# Patient Record
Sex: MALE | Race: WHITE | NOT HISPANIC OR LATINO | Employment: FULL TIME | URBAN - METROPOLITAN AREA
[De-identification: names, ages, dates, MRNs, and addresses within clinical notes are randomized per-mention and may not be internally consistent; named-entity substitution may affect disease eponyms.]

---

## 2017-08-15 ENCOUNTER — OFFICE VISIT (OUTPATIENT)
Dept: ORTHOPEDIC SURGERY | Facility: CLINIC | Age: 77
End: 2017-08-15

## 2017-08-15 VITALS — BODY MASS INDEX: 23.84 KG/M2 | TEMPERATURE: 98 F | WEIGHT: 176 LBS | HEIGHT: 72 IN

## 2017-08-15 DIAGNOSIS — M25.561 ACUTE PAIN OF RIGHT KNEE: Primary | ICD-10-CM

## 2017-08-15 PROCEDURE — 73562 X-RAY EXAM OF KNEE 3: CPT | Performed by: NURSE PRACTITIONER

## 2017-08-15 PROCEDURE — 99213 OFFICE O/P EST LOW 20 MIN: CPT | Performed by: NURSE PRACTITIONER

## 2017-08-15 RX ORDER — MELOXICAM 7.5 MG/1
TABLET ORAL
Refills: 0 | COMMUNITY
Start: 2017-08-02 | End: 2017-09-07

## 2017-08-15 NOTE — PROGRESS NOTES
Patient: Hu Harris  YOB: 1940 77 y.o. male  Medical Record Number: [id number]    Chief Complaints:   Chief Complaint   Patient presents with   • Right Knee - Establish Care, Pain       History of Present Illness:Hu Harris is a 77 y.o. male who presents With complaints of right knee pain.  Patient reports it started about 3-4 weeks ago, denies any injury, just started with some generalized anterior knee aching and it has progressively gotten worse.  He describes the right knee pain as a severe stabbing type pain with clicking, worse with going up and down stairs, better with ice and rest.  He apparently sees pain management for his back in 5 days ago had a cortisone injection into the right knee.  It helped quite a bit for the first 24 hours, but the pain has returned.    Allergies:   Allergies   Allergen Reactions   • Iodine    • Penicillins        Medications:   Current Outpatient Prescriptions   Medication Sig Dispense Refill   • aspirin 325 MG tablet Take 325 mg by mouth daily.     • atorvastatin (LIPITOR) 40 MG tablet Take 40 mg by mouth daily.     • clonazePAM (KlonoPIN) 0.5 MG tablet   1   • levothyroxine (SYNTHROID, LEVOTHROID) 88 MCG tablet   2   • Multiple Vitamins-Minerals (MULTIVITAMIN WITH MINERALS) tablet Take 1 tablet by mouth daily.     • meloxicam (MOBIC) 7.5 MG tablet TAKE 1 T PO QD  0   • Naproxen Sodium 220 MG capsule Take  by mouth.       No current facility-administered medications for this visit.          The following portions of the patient's history were reviewed and updated as appropriate: allergies, current medications, past family history, past medical history, past social history, past surgical history and problem list.    Review of Systems:   A 14 point review of systems was performed. All systems negative except pertinent positives/negative listed in HPI above    Physical Exam:   Vitals:    08/15/17 1323   Temp: 98 °F (36.7 °C)   TempSrc: Temporal Artery    Weight:  "176 lb (79.8 kg)   Height: 72\" (182.9 cm)       General: A and O x 3, ASA, NAD    SCLERA:    Normal    DENTITION:   Normal  Skin clear no unusual lesions noted  Right knee patient has no appreciable effusion, 110° flexion, neutral in extension, with a positive medial Mihai negative Lockman calf is soft and nontender    Radiology:  Xrays 3views (ap,lateral, sunrise) right knee were ordered and reviewed today secondary to pain and show mild arthritic changes, particularly involving the patellofemoral compartment.  Comparative views are unchanged     Assessment/Plan:  Persistent severe right knee pain, rule out loose body    We will proceed with an MRI of the right knee.  Patient will call couple days after regarding the results.  "

## 2017-08-18 ENCOUNTER — OFFICE VISIT (OUTPATIENT)
Dept: ORTHOPEDIC SURGERY | Facility: CLINIC | Age: 77
End: 2017-08-18

## 2017-08-18 DIAGNOSIS — M25.561 ACUTE PAIN OF RIGHT KNEE: ICD-10-CM

## 2017-08-18 PROCEDURE — 73721 MRI JNT OF LWR EXTRE W/O DYE: CPT | Performed by: NURSE PRACTITIONER

## 2017-08-21 ENCOUNTER — TELEPHONE (OUTPATIENT)
Dept: ORTHOPEDIC SURGERY | Facility: CLINIC | Age: 77
End: 2017-08-21

## 2017-08-22 ENCOUNTER — TELEPHONE (OUTPATIENT)
Dept: ORTHOPEDIC SURGERY | Facility: CLINIC | Age: 77
End: 2017-08-22

## 2017-08-22 NOTE — TELEPHONE ENCOUNTER
NOTIFIED AND ADVISED PT OF MRI RESULTS.     MLL ALSO SPOKE TO PT EXTENSIVELY IN REGARDS TO HIS MRI RESULTS.   PT WILL KEEP HIS APPT Friday W/KAREEM

## 2017-08-22 NOTE — TELEPHONE ENCOUNTER
----- Message from FELIPE Nick sent at 8/22/2017  1:06 PM EDT -----  Please let patient know that the MRI of his right knee does show a meniscus tear, however it is secondary to the arthritis that he has in that knee.  There is more arthritis seen on MRI than appreciated on x-ray.  It is too soon for another cortisone injection, would recommend continued anti-inflammatories if he is able to take them and physical therapy exercises for both knees.  Would recommend that he make all up appointment with Dr. Pappas in approximately 4-6 weeks if still having pain.

## 2017-08-22 NOTE — TELEPHONE ENCOUNTER
Called patient with MRI results per MLL    Patient did not answer the phone so I was able to leave a VM

## 2017-08-25 ENCOUNTER — OFFICE VISIT (OUTPATIENT)
Dept: ORTHOPEDIC SURGERY | Facility: CLINIC | Age: 77
End: 2017-08-25

## 2017-08-25 ENCOUNTER — TELEPHONE (OUTPATIENT)
Dept: ORTHOPEDIC SURGERY | Facility: CLINIC | Age: 77
End: 2017-08-25

## 2017-08-25 VITALS — TEMPERATURE: 100 F | BODY MASS INDEX: 25.48 KG/M2 | HEIGHT: 70 IN | WEIGHT: 178 LBS

## 2017-08-25 DIAGNOSIS — M25.561 ACUTE PAIN OF RIGHT KNEE: Primary | ICD-10-CM

## 2017-08-25 PROCEDURE — 99212 OFFICE O/P EST SF 10 MIN: CPT | Performed by: NURSE PRACTITIONER

## 2017-08-25 NOTE — PROGRESS NOTES
"Patient: Hu Harris  YOB: 1940 77 y.o. male  Medical Record Number: [id number]    Chief Complaints:   Chief Complaint   Patient presents with   • Right Knee - Follow-up       History of Present Illness:Hu Harris is a 77 y.o. male who presents for follow-up of  Right knee pain.  We have had several discussions regarding his MRI.  He is still adamant that his pain is all from torn meniscus and has nothing to do with arthritis.  Dr. Pappas came in and saw the patient as well and after long discussion he would like to see Dr. Jimena Ho for further evaluation and treatment and to possibly discuss knee arthroscopy and possible subchondral plan see.    Allergies:   Allergies   Allergen Reactions   • Iodine    • Penicillins        Medications:   Current Outpatient Prescriptions   Medication Sig Dispense Refill   • atorvastatin (LIPITOR) 40 MG tablet Take 20 mg by mouth Daily.     • clonazePAM (KlonoPIN) 0.5 MG tablet   1   • levothyroxine (SYNTHROID, LEVOTHROID) 88 MCG tablet   2   • meloxicam (MOBIC) 7.5 MG tablet TAKE 1 T PO QD  0   • Multiple Vitamins-Minerals (MULTIVITAMIN WITH MINERALS) tablet Take 1 tablet by mouth daily.     • aspirin 325 MG tablet Take 325 mg by mouth daily.     • Naproxen Sodium 220 MG capsule Take  by mouth.       No current facility-administered medications for this visit.          The following portions of the patient's history were reviewed and updated as appropriate: allergies, current medications, past family history, past medical history, past social history, past surgical history and problem list.    Review of Systems:   A 14 point review of systems was performed. All systems negative except pertinent positives/negative listed in HPI above    Physical Exam:   Vitals:    08/25/17 1504   Temp: 100 °F (37.8 °C)   Weight: 178 lb (80.7 kg)   Height: 70\" (177.8 cm)       General: A and O x 3, ASA, NAD    SCLERA:    Normal    DENTITION:   Normal  Right knee trace amount of " fusion with image range of motion secondary to pain    Assessment/Plan:  Right knee pain secondary to torn meniscus complicated by osteoarthritis    Patient was referred to Dr. Jimena Ho to see if he would be a candidate for knee arthroscopy and possible subchondral plan the

## 2017-08-28 ENCOUNTER — TELEPHONE (OUTPATIENT)
Dept: ORTHOPEDIC SURGERY | Facility: CLINIC | Age: 77
End: 2017-08-28

## 2017-08-31 ENCOUNTER — CONSULT (OUTPATIENT)
Dept: ORTHOPEDIC SURGERY | Facility: CLINIC | Age: 77
End: 2017-08-31

## 2017-08-31 VITALS — WEIGHT: 176.8 LBS | BODY MASS INDEX: 25.31 KG/M2 | TEMPERATURE: 99.1 F | HEIGHT: 70 IN

## 2017-08-31 DIAGNOSIS — S83.241A OTHER TEAR OF MEDIAL MENISCUS OF RIGHT KNEE AS CURRENT INJURY, INITIAL ENCOUNTER: Primary | ICD-10-CM

## 2017-08-31 PROCEDURE — 99214 OFFICE O/P EST MOD 30 MIN: CPT | Performed by: ORTHOPAEDIC SURGERY

## 2017-09-01 PROBLEM — S83.209A CURRENT TEAR OF MENISCUS OF KNEE: Status: ACTIVE | Noted: 2017-09-01

## 2017-09-06 ENCOUNTER — TELEPHONE (OUTPATIENT)
Dept: ORTHOPEDIC SURGERY | Facility: CLINIC | Age: 77
End: 2017-09-06

## 2017-09-07 ENCOUNTER — APPOINTMENT (OUTPATIENT)
Dept: PREADMISSION TESTING | Facility: HOSPITAL | Age: 77
End: 2017-09-07

## 2017-09-07 VITALS
DIASTOLIC BLOOD PRESSURE: 80 MMHG | SYSTOLIC BLOOD PRESSURE: 150 MMHG | TEMPERATURE: 97.8 F | RESPIRATION RATE: 16 BRPM | WEIGHT: 176 LBS | HEIGHT: 72 IN | BODY MASS INDEX: 23.84 KG/M2 | HEART RATE: 66 BPM | OXYGEN SATURATION: 98 %

## 2017-09-07 LAB
DEPRECATED RDW RBC AUTO: 46.1 FL (ref 37–54)
ERYTHROCYTE [DISTWIDTH] IN BLOOD BY AUTOMATED COUNT: 14.2 % (ref 11.5–14.5)
HCT VFR BLD AUTO: 43.5 % (ref 40.4–52.2)
HGB BLD-MCNC: 14.4 G/DL (ref 13.7–17.6)
MCH RBC QN AUTO: 29.5 PG (ref 27–32.7)
MCHC RBC AUTO-ENTMCNC: 33.1 G/DL (ref 32.6–36.4)
MCV RBC AUTO: 89.1 FL (ref 79.8–96.2)
PLATELET # BLD AUTO: 227 10*3/MM3 (ref 140–500)
PMV BLD AUTO: 11.3 FL (ref 6–12)
RBC # BLD AUTO: 4.88 10*6/MM3 (ref 4.6–6)
WBC NRBC COR # BLD: 6.19 10*3/MM3 (ref 4.5–10.7)

## 2017-09-07 PROCEDURE — 93005 ELECTROCARDIOGRAM TRACING: CPT

## 2017-09-07 PROCEDURE — 85027 COMPLETE CBC AUTOMATED: CPT | Performed by: ORTHOPAEDIC SURGERY

## 2017-09-07 PROCEDURE — 36415 COLL VENOUS BLD VENIPUNCTURE: CPT

## 2017-09-07 PROCEDURE — 93010 ELECTROCARDIOGRAM REPORT: CPT | Performed by: INTERNAL MEDICINE

## 2017-09-07 RX ORDER — MELOXICAM 7.5 MG/1
7.5 TABLET ORAL EVERY MORNING
COMMUNITY
End: 2021-06-04

## 2017-09-07 NOTE — TELEPHONE ENCOUNTER
If he puts them on Flomax pre and post -op he usually sends them home with 1 week supply.  After that he refers them back to PCP or urology

## 2017-09-07 NOTE — TELEPHONE ENCOUNTER
This patient was put on Flomax by Dr. Pappas for his knee replacement can you find out how much he put him on an: In for me

## 2017-09-07 NOTE — DISCHARGE INSTRUCTIONS
SURGERY 9-11-17   ARRIVAL TIME 6:45    Take the following medications the morning of surgery with a small sip of water:  NONE      General Instructions:  • Do not eat solid food after midnight the night before surgery.  • You may drink clear liquids day of surgery but must stop at least one hour before your hospital arrival time.  • It is beneficial for you to have a clear drink that contains carbohydrates the day of surgery.  We suggest a 20 ounce bottle of Gatorade or Powerade for non-diabetic patients or a 20 ounce bottle of G2 or Powerade Zero for diabetic patients. (Pediatric patients, are not advised to drink a 20 ounce carbohydrate drink)    Clear liquids are liquids you can see through.  Nothing red in color.     Plain water                               Sports drinks  Sodas                                   Gelatin (Jell-O)  Fruit juices without pulp such as white grape juice and apple juice  Popsicles that contain no fruit or yogurt  Tea or coffee (no cream or milk added)  Gatorade / Powerade  G2 / Powerade Zero    • Infants may have breast milk up to four hours before surgery.  • Infants drinking formula may drink formula up to six hours before surgery.   • Patients who avoid smoking, chewing tobacco and alcohol for 4 weeks prior to surgery have a reduced risk of post-operative complications.  Quit smoking as many days before surgery as you can.  • Do not smoke, use chewing tobacco or drink alcohol the day of surgery.   • If applicable bring your C-PAP/ BI-PAP machine.  • Bring any papers given to you in the doctor’s office.  • Wear clean comfortable clothes and socks.  • Do not wear contact lenses or make-up.  Bring a case for your glasses.   • Bring crutches or walker if applicable.  • Leave all other valuables and jewelry at home.  • The Pre-Admission Testing nurse will instruct you to bring medications if unable to obtain an accurate list in Pre-Admission Testing.        If you were given a blood bank  ID arm band remember to bring it with you the day of surgery.    Preventing a Surgical Site Infection:  • For 2 to 3 days before surgery, avoid shaving with a razor because the razor can irritate skin and make it easier to develop an infection.  • The night prior to surgery sleep in a clean bed with clean clothing.  Do not allow pets to sleep with you.  • Shower on the morning of surgery using a fresh bar of anti-bacterial soap (such as Dial) and clean washcloth.  Dry with a clean towel and dress in clean clothing.  • Ask your surgeon if you will be receiving antibiotics prior to surgery.  • Make sure you, your family, and all healthcare providers clean their hands with soap and water or an alcohol based hand  before caring for you or your wound.    Day of surgery:  Upon arrival, a Pre-op nurse and Anesthesiologist will review your health history, obtain vital signs, and answer questions you may have.  The only belongings needed at this time will be your home medications and if applicable your C-PAP/BI-PAP machine.  If you are staying overnight your family can leave the rest of your belongings in the car and bring them to your room later.  A Pre-op nurse will start an IV and you may receive medication in preparation for surgery, including something to help you relax.  Your family will be able to see you in the Pre-op area.  While you are in surgery your family should notify the waiting room  if they leave the waiting room area and provide a contact phone number.    Please be aware that surgery does come with discomfort.  We want to make every effort to control your discomfort so please discuss any uncontrolled symptoms with your nurse.   Your doctor will most likely have prescribed pain medications.      If you are going home after surgery you will receive individualized written care instructions before being discharged.  A responsible adult must drive you to and from the hospital on the day of  your surgery and stay with you for 24 hours.    If you are staying overnight following surgery, you will be transported to your hospital room following the recovery period.  [facility] has all private rooms.    If you have any questions please call Pre-Admission Testing at [phone].  Deductibles and co-payments are collected on the day of service. Please be prepared to pay the required co-pay, deductible or deposit on the day of service as defined by your plan.

## 2017-09-07 NOTE — TELEPHONE ENCOUNTER
Dr. Mian Rushing gave this patient Flomax before his knee replacement is that routine is that something we can call in from him or should I have him call his primary care

## 2017-09-08 ENCOUNTER — TELEPHONE (OUTPATIENT)
Dept: ORTHOPEDIC SURGERY | Facility: CLINIC | Age: 77
End: 2017-09-08

## 2017-09-08 DIAGNOSIS — Z87.898 H/O URINARY RETENTION: Primary | ICD-10-CM

## 2017-09-08 RX ORDER — TAMSULOSIN HYDROCHLORIDE 0.4 MG/1
1 CAPSULE ORAL NIGHTLY
Qty: 7 CAPSULE | Refills: 0 | Status: SHIPPED | OUTPATIENT
Start: 2017-09-08 | End: 2020-09-11

## 2017-09-08 NOTE — TELEPHONE ENCOUNTER
Patient does have a history of postop urinary retention and is taken Flomax in the past.  Have E prescribed a new prescription to [facility] at [phone] for Flomax 0.4 mg, #7, directions her to take 1 by mouth nightly per [facility]

## 2017-09-11 ENCOUNTER — ANESTHESIA EVENT (OUTPATIENT)
Dept: PERIOP | Facility: HOSPITAL | Age: 77
End: 2017-09-11

## 2017-09-11 ENCOUNTER — ANESTHESIA (OUTPATIENT)
Dept: PERIOP | Facility: HOSPITAL | Age: 77
End: 2017-09-11

## 2017-09-11 ENCOUNTER — HOSPITAL ENCOUNTER (OUTPATIENT)
Facility: HOSPITAL | Age: 77
Setting detail: HOSPITAL OUTPATIENT SURGERY
Discharge: HOME | End: 2017-09-11
Attending: ORTHOPAEDIC SURGERY | Admitting: ORTHOPAEDIC SURGERY

## 2017-09-11 VITALS
SYSTOLIC BLOOD PRESSURE: 145 MMHG | HEART RATE: 76 BPM | RESPIRATION RATE: 16 BRPM | DIASTOLIC BLOOD PRESSURE: 81 MMHG | OXYGEN SATURATION: 99 % | TEMPERATURE: 97.3 F

## 2017-09-11 DIAGNOSIS — S83.241D OTHER TEAR OF MEDIAL MENISCUS OF RIGHT KNEE AS CURRENT INJURY, SUBSEQUENT ENCOUNTER: Primary | ICD-10-CM

## 2017-09-11 PROCEDURE — 25010000002 EPINEPHRINE PER 0.1 MG: Performed by: ORTHOPAEDIC SURGERY

## 2017-09-11 PROCEDURE — 25010000002 FENTANYL CITRATE (PF) 100 MCG/2ML SOLUTION: Performed by: NURSE ANESTHETIST, CERTIFIED REGISTERED

## 2017-09-11 PROCEDURE — 25010000002 PROPOFOL 10 MG/ML EMULSION: Performed by: NURSE ANESTHETIST, CERTIFIED REGISTERED

## 2017-09-11 PROCEDURE — 29881 ARTHRS KNE SRG MNISECTMY M/L: CPT | Performed by: ORTHOPAEDIC SURGERY

## 2017-09-11 PROCEDURE — 25010000002 KETOROLAC TROMETHAMINE PER 15 MG: Performed by: NURSE ANESTHETIST, CERTIFIED REGISTERED

## 2017-09-11 PROCEDURE — 25010000002 METHYLPREDNISOLONE PER 40 MG: Performed by: ORTHOPAEDIC SURGERY

## 2017-09-11 PROCEDURE — 25010000002 ONDANSETRON PER 1 MG: Performed by: NURSE ANESTHETIST, CERTIFIED REGISTERED

## 2017-09-11 PROCEDURE — 25010000002 MIDAZOLAM PER 1 MG: Performed by: ANESTHESIOLOGY

## 2017-09-11 PROCEDURE — 25010000002 DEXAMETHASONE PER 1 MG: Performed by: NURSE ANESTHETIST, CERTIFIED REGISTERED

## 2017-09-11 PROCEDURE — 25010000003 CEFAZOLIN PER 500 MG: Performed by: ORTHOPAEDIC SURGERY

## 2017-09-11 RX ORDER — CEFAZOLIN SODIUM 2 G/100ML
INJECTION, SOLUTION INTRAVENOUS
Status: DISCONTINUED
Start: 2017-09-11 | End: 2017-09-11 | Stop reason: HOSPADM

## 2017-09-11 RX ORDER — ONDANSETRON 2 MG/ML
4 INJECTION INTRAMUSCULAR; INTRAVENOUS ONCE AS NEEDED
Status: DISCONTINUED | OUTPATIENT
Start: 2017-09-11 | End: 2017-09-11 | Stop reason: HOSPADM

## 2017-09-11 RX ORDER — FLUMAZENIL 0.1 MG/ML
0.2 INJECTION INTRAVENOUS AS NEEDED
Status: DISCONTINUED | OUTPATIENT
Start: 2017-09-11 | End: 2017-09-11 | Stop reason: HOSPADM

## 2017-09-11 RX ORDER — PROMETHAZINE HYDROCHLORIDE 25 MG/1
25 SUPPOSITORY RECTAL ONCE AS NEEDED
Status: DISCONTINUED | OUTPATIENT
Start: 2017-09-11 | End: 2017-09-11 | Stop reason: HOSPADM

## 2017-09-11 RX ORDER — KETOROLAC TROMETHAMINE 30 MG/ML
INJECTION, SOLUTION INTRAMUSCULAR; INTRAVENOUS AS NEEDED
Status: DISCONTINUED | OUTPATIENT
Start: 2017-09-11 | End: 2017-09-11 | Stop reason: SURG

## 2017-09-11 RX ORDER — CEFAZOLIN SODIUM 1 G/3ML
2 INJECTION, POWDER, FOR SOLUTION INTRAMUSCULAR; INTRAVENOUS ONCE
Status: COMPLETED | OUTPATIENT
Start: 2017-09-11 | End: 2017-09-11

## 2017-09-11 RX ORDER — FENTANYL CITRATE 50 UG/ML
50 INJECTION, SOLUTION INTRAMUSCULAR; INTRAVENOUS
Status: DISCONTINUED | OUTPATIENT
Start: 2017-09-11 | End: 2017-09-11 | Stop reason: HOSPADM

## 2017-09-11 RX ORDER — PROMETHAZINE HYDROCHLORIDE 25 MG/ML
12.5 INJECTION, SOLUTION INTRAMUSCULAR; INTRAVENOUS ONCE AS NEEDED
Status: DISCONTINUED | OUTPATIENT
Start: 2017-09-11 | End: 2017-09-11 | Stop reason: HOSPADM

## 2017-09-11 RX ORDER — HYDRALAZINE HYDROCHLORIDE 20 MG/ML
5 INJECTION INTRAMUSCULAR; INTRAVENOUS
Status: DISCONTINUED | OUTPATIENT
Start: 2017-09-11 | End: 2017-09-11 | Stop reason: HOSPADM

## 2017-09-11 RX ORDER — LABETALOL HYDROCHLORIDE 5 MG/ML
5 INJECTION, SOLUTION INTRAVENOUS
Status: DISCONTINUED | OUTPATIENT
Start: 2017-09-11 | End: 2017-09-11 | Stop reason: HOSPADM

## 2017-09-11 RX ORDER — MIDAZOLAM HYDROCHLORIDE 1 MG/ML
2 INJECTION INTRAMUSCULAR; INTRAVENOUS
Status: DISCONTINUED | OUTPATIENT
Start: 2017-09-11 | End: 2017-09-11 | Stop reason: HOSPADM

## 2017-09-11 RX ORDER — PROPOFOL 10 MG/ML
VIAL (ML) INTRAVENOUS AS NEEDED
Status: DISCONTINUED | OUTPATIENT
Start: 2017-09-11 | End: 2017-09-11 | Stop reason: SURG

## 2017-09-11 RX ORDER — LIDOCAINE HYDROCHLORIDE 20 MG/ML
INJECTION, SOLUTION INFILTRATION; PERINEURAL AS NEEDED
Status: DISCONTINUED | OUTPATIENT
Start: 2017-09-11 | End: 2017-09-11 | Stop reason: SURG

## 2017-09-11 RX ORDER — SODIUM CHLORIDE 0.9 % (FLUSH) 0.9 %
1-10 SYRINGE (ML) INJECTION AS NEEDED
Status: DISCONTINUED | OUTPATIENT
Start: 2017-09-11 | End: 2017-09-11 | Stop reason: HOSPADM

## 2017-09-11 RX ORDER — BUPIVACAINE HYDROCHLORIDE AND EPINEPHRINE 2.5; 5 MG/ML; UG/ML
INJECTION, SOLUTION INFILTRATION; PERINEURAL AS NEEDED
Status: DISCONTINUED | OUTPATIENT
Start: 2017-09-11 | End: 2017-09-11 | Stop reason: HOSPADM

## 2017-09-11 RX ORDER — SODIUM CHLORIDE, SODIUM LACTATE, POTASSIUM CHLORIDE, CALCIUM CHLORIDE 600; 310; 30; 20 MG/100ML; MG/100ML; MG/100ML; MG/100ML
9 INJECTION, SOLUTION INTRAVENOUS CONTINUOUS
Status: DISCONTINUED | OUTPATIENT
Start: 2017-09-11 | End: 2017-09-11 | Stop reason: HOSPADM

## 2017-09-11 RX ORDER — FAMOTIDINE 10 MG/ML
20 INJECTION, SOLUTION INTRAVENOUS ONCE
Status: COMPLETED | OUTPATIENT
Start: 2017-09-11 | End: 2017-09-11

## 2017-09-11 RX ORDER — ONDANSETRON 2 MG/ML
INJECTION INTRAMUSCULAR; INTRAVENOUS AS NEEDED
Status: DISCONTINUED | OUTPATIENT
Start: 2017-09-11 | End: 2017-09-11 | Stop reason: SURG

## 2017-09-11 RX ORDER — DEXAMETHASONE SODIUM PHOSPHATE 10 MG/ML
INJECTION INTRAMUSCULAR; INTRAVENOUS AS NEEDED
Status: DISCONTINUED | OUTPATIENT
Start: 2017-09-11 | End: 2017-09-11 | Stop reason: SURG

## 2017-09-11 RX ORDER — PROMETHAZINE HYDROCHLORIDE 25 MG/1
25 TABLET ORAL ONCE AS NEEDED
Status: DISCONTINUED | OUTPATIENT
Start: 2017-09-11 | End: 2017-09-11 | Stop reason: HOSPADM

## 2017-09-11 RX ORDER — METHYLPREDNISOLONE ACETATE 40 MG/ML
INJECTION, SUSPENSION INTRA-ARTICULAR; INTRALESIONAL; INTRAMUSCULAR; SOFT TISSUE AS NEEDED
Status: DISCONTINUED | OUTPATIENT
Start: 2017-09-11 | End: 2017-09-11 | Stop reason: HOSPADM

## 2017-09-11 RX ORDER — MIDAZOLAM HYDROCHLORIDE 1 MG/ML
1 INJECTION INTRAMUSCULAR; INTRAVENOUS
Status: DISCONTINUED | OUTPATIENT
Start: 2017-09-11 | End: 2017-09-11 | Stop reason: HOSPADM

## 2017-09-11 RX ORDER — HYDROCODONE BITARTRATE AND ACETAMINOPHEN 7.5; 325 MG/1; MG/1
TABLET ORAL
Qty: 50 TABLET | Refills: 0 | Status: SHIPPED | OUTPATIENT
Start: 2017-09-11 | End: 2017-11-09

## 2017-09-11 RX ORDER — DIPHENHYDRAMINE HYDROCHLORIDE 50 MG/ML
12.5 INJECTION INTRAMUSCULAR; INTRAVENOUS
Status: DISCONTINUED | OUTPATIENT
Start: 2017-09-11 | End: 2017-09-11 | Stop reason: HOSPADM

## 2017-09-11 RX ORDER — HYDROMORPHONE HYDROCHLORIDE 1 MG/ML
0.5 INJECTION, SOLUTION INTRAMUSCULAR; INTRAVENOUS; SUBCUTANEOUS
Status: DISCONTINUED | OUTPATIENT
Start: 2017-09-11 | End: 2017-09-11 | Stop reason: HOSPADM

## 2017-09-11 RX ORDER — PROMETHAZINE HYDROCHLORIDE 25 MG/1
12.5 TABLET ORAL ONCE AS NEEDED
Status: DISCONTINUED | OUTPATIENT
Start: 2017-09-11 | End: 2017-09-11 | Stop reason: HOSPADM

## 2017-09-11 RX ORDER — EPHEDRINE SULFATE 50 MG/ML
INJECTION, SOLUTION INTRAVENOUS AS NEEDED
Status: DISCONTINUED | OUTPATIENT
Start: 2017-09-11 | End: 2017-09-11 | Stop reason: SURG

## 2017-09-11 RX ORDER — EPHEDRINE SULFATE 50 MG/ML
5 INJECTION, SOLUTION INTRAVENOUS ONCE AS NEEDED
Status: DISCONTINUED | OUTPATIENT
Start: 2017-09-11 | End: 2017-09-11 | Stop reason: HOSPADM

## 2017-09-11 RX ORDER — OXYCODONE AND ACETAMINOPHEN 7.5; 325 MG/1; MG/1
1 TABLET ORAL ONCE AS NEEDED
Status: DISCONTINUED | OUTPATIENT
Start: 2017-09-11 | End: 2017-09-11 | Stop reason: HOSPADM

## 2017-09-11 RX ORDER — NALOXONE HCL 0.4 MG/ML
0.2 VIAL (ML) INJECTION AS NEEDED
Status: DISCONTINUED | OUTPATIENT
Start: 2017-09-11 | End: 2017-09-11 | Stop reason: HOSPADM

## 2017-09-11 RX ORDER — FENTANYL CITRATE 50 UG/ML
INJECTION, SOLUTION INTRAMUSCULAR; INTRAVENOUS AS NEEDED
Status: DISCONTINUED | OUTPATIENT
Start: 2017-09-11 | End: 2017-09-11 | Stop reason: SURG

## 2017-09-11 RX ORDER — HYDROCODONE BITARTRATE AND ACETAMINOPHEN 7.5; 325 MG/1; MG/1
1 TABLET ORAL ONCE AS NEEDED
Status: COMPLETED | OUTPATIENT
Start: 2017-09-11 | End: 2017-09-11

## 2017-09-11 RX ADMIN — FENTANYL CITRATE 50 MCG: 50 INJECTION INTRAMUSCULAR; INTRAVENOUS at 10:31

## 2017-09-11 RX ADMIN — MIDAZOLAM 1 MG: 1 INJECTION INTRAMUSCULAR; INTRAVENOUS at 08:06

## 2017-09-11 RX ADMIN — SODIUM CHLORIDE, POTASSIUM CHLORIDE, SODIUM LACTATE AND CALCIUM CHLORIDE 9 ML/HR: 600; 310; 30; 20 INJECTION, SOLUTION INTRAVENOUS at 07:18

## 2017-09-11 RX ADMIN — FAMOTIDINE 20 MG: 10 INJECTION, SOLUTION INTRAVENOUS at 08:05

## 2017-09-11 RX ADMIN — KETOROLAC TROMETHAMINE 30 MG: 30 INJECTION, SOLUTION INTRAMUSCULAR; INTRAVENOUS at 10:57

## 2017-09-11 RX ADMIN — PROPOFOL 200 MG: 10 INJECTION, EMULSION INTRAVENOUS at 10:08

## 2017-09-11 RX ADMIN — ONDANSETRON 4 MG: 2 INJECTION INTRAMUSCULAR; INTRAVENOUS at 10:57

## 2017-09-11 RX ADMIN — FENTANYL CITRATE 50 MCG: 50 INJECTION INTRAMUSCULAR; INTRAVENOUS at 11:33

## 2017-09-11 RX ADMIN — CEFAZOLIN 2 G: 1 INJECTION, POWDER, FOR SOLUTION INTRAMUSCULAR; INTRAVENOUS; PARENTERAL at 10:06

## 2017-09-11 RX ADMIN — FENTANYL CITRATE 50 MCG: 50 INJECTION INTRAMUSCULAR; INTRAVENOUS at 11:47

## 2017-09-11 RX ADMIN — SODIUM CHLORIDE, POTASSIUM CHLORIDE, SODIUM LACTATE AND CALCIUM CHLORIDE: 600; 310; 30; 20 INJECTION, SOLUTION INTRAVENOUS at 10:39

## 2017-09-11 RX ADMIN — DEXAMETHASONE SODIUM PHOSPHATE 6 MG: 10 INJECTION INTRAMUSCULAR; INTRAVENOUS at 10:57

## 2017-09-11 RX ADMIN — EPHEDRINE SULFATE 10 MG: 50 INJECTION INTRAMUSCULAR; INTRAVENOUS; SUBCUTANEOUS at 10:18

## 2017-09-11 RX ADMIN — MIDAZOLAM 1 MG: 1 INJECTION INTRAMUSCULAR; INTRAVENOUS at 08:51

## 2017-09-11 RX ADMIN — EPHEDRINE SULFATE 10 MG: 50 INJECTION INTRAMUSCULAR; INTRAVENOUS; SUBCUTANEOUS at 10:36

## 2017-09-11 RX ADMIN — HYDROCODONE BITARTRATE AND ACETAMINOPHEN 1 TABLET: 7.5; 325 TABLET ORAL at 11:38

## 2017-09-11 RX ADMIN — FENTANYL CITRATE 50 MCG: 50 INJECTION INTRAMUSCULAR; INTRAVENOUS at 10:08

## 2017-09-11 RX ADMIN — LIDOCAINE HYDROCHLORIDE 100 MG: 20 INJECTION, SOLUTION INFILTRATION; PERINEURAL at 10:08

## 2017-09-11 NOTE — PLAN OF CARE
Problem: Perioperative Period (Adult)  Goal: Signs and Symptoms of Listed Potential Problems Will be Absent or Manageable (Perioperative Period)  Outcome: Ongoing (interventions implemented as appropriate)    09/11/17 8215   Perioperative Period   Problems Assessed (Perioperative Period) all   Problems Present (Perioperative Period) none

## 2017-09-11 NOTE — PLAN OF CARE
Problem: Perioperative Period (Adult)  Goal: Signs and Symptoms of Listed Potential Problems Will be Absent or Manageable (Perioperative Period)  Outcome: Outcome(s) achieved Date Met:  09/11/17 09/11/17 1240   Perioperative Period   Problems Assessed (Perioperative Period) all   Problems Present (Perioperative Period) pain

## 2017-09-11 NOTE — ANESTHESIA POSTPROCEDURE EVALUATION
Patient: Hu Harris    Procedure Summary     Date Anesthesia Start Anesthesia Stop Room / Location    09/11/17 1006 1102  [facility]       Procedure Diagnosis Surgeon Provider    KNEE ARTHROSCOPY WITH PARTICAL MEDIAL MENISECTOMY AND ARTHRITIS DEBRIDMENT (Right Knee) Other tear of medial meniscus of right knee as current injury, initial encounter  (Other tear of medial meniscus of right knee as current injury, initial encounter [S83.241A]) MD Sheldon Reich MD          Anesthesia Type: general  Last vitals  BP   145/81 (09/11/17 1211)    Temp   36.3 °C (97.3 °F) (09/11/17 1145)    Pulse   76 (09/11/17 1211)   Resp   16 (09/11/17 1211)    SpO2   99 % (09/11/17 1211)      Post Anesthesia Care and Evaluation    Patient location during evaluation: bedside  Patient participation: complete - patient participated  Level of consciousness: awake  Pain score: 2  Pain management: adequate  Airway patency: patent  Anesthetic complications: No anesthetic complications    Cardiovascular status: acceptable  Respiratory status: acceptable  Hydration status: acceptable    Comments: /81 (BP Location: Right arm, Patient Position: Lying)  Pulse 76  Temp 36.3 °C (97.3 °F) (Temporal Artery )   Resp 16  SpO2 99%

## 2017-09-11 NOTE — PLAN OF CARE
Problem: Patient Care Overview (Adult)  Goal: Discharge Needs Assessment  Outcome: Outcome(s) achieved Date Met:  09/11/17 09/11/17 1242   Discharge Needs Assessment   Concerns To Be Addressed no discharge needs identified   Equipment Needed After Discharge walker, standard   Living Environment   Transportation Available car;family or friend will provide

## 2017-09-11 NOTE — PLAN OF CARE
Problem: Patient Care Overview (Adult)  Goal: Plan of Care Review  Outcome: Ongoing (interventions implemented as appropriate)    09/11/17 0767   Coping/Psychosocial Response Interventions   Plan Of Care Reviewed With patient   Patient Care Overview   Progress improving

## 2017-09-11 NOTE — H&P
History & Physical       Patient: Hu Harris    Date of Admission: No admission date for patient encounter.    YOB: 1940    Medical Record Number: [id number]    Attending Physician: Jimena Ho MD        Chief Complaints: Other tear of medial meniscus of right knee as current injury, initial encounter [S84.428A]      History of Present Illness: This is a 77-year-old patient who is quite active who has a history of several weeks of right knee pain.  He has an MRI which shows a medial meniscus tear distress reactions mediofemoral condyle and some OA.  He presents for arthroscopy partial medial meniscectomy possible subchondral plasty     Allergies:   Allergies   Allergen Reactions   • Iodine Swelling     WITH SHELLFISH   • Penicillins Other (See Comments)     TOLD SO YEARS AGO   • Shellfish-Derived Products Other (See Comments)     SWELLING       Medications:   Home Medications:  No current facility-administered medications on file prior to encounter.      Current Outpatient Prescriptions on File Prior to Encounter   Medication Sig   • aspirin 325 MG tablet Take 325 mg by mouth Every Morning.   • atorvastatin (LIPITOR) 40 MG tablet Take 20 mg by mouth Every Night.   • clonazePAM (KlonoPIN) 0.5 MG tablet Take 0.5 mg by mouth At Night As Needed.   • levothyroxine (SYNTHROID, LEVOTHROID) 88 MCG tablet Take 88 mcg by mouth Every Morning.   • Multiple Vitamins-Minerals (MULTIVITAMIN WITH MINERALS) tablet Take 1 tablet by mouth Every Morning.     Current Medications:  Scheduled Meds:  Continuous Infusions:  No current facility-administered medications for this encounter.   PRN Meds:.    Past Medical History:   Diagnosis Date   • Arthritis    • Back pain    • Disease of thyroid gland    • High cholesterol    • Knee pain, right     TORN MENISCUS        Past Surgical History:   Procedure Laterality Date   • FOOT SURGERY Bilateral    • HERNIA REPAIR  1997   • KNEE SURGERY Left 1972    partial replacement          Social History     Occupational History   • Not on file.     Social History Main Topics   • Smoking status: Former Smoker     Packs/day: 0.50     Years: 12.00     Types: Cigarettes     Quit date: 1965   • Smokeless tobacco: Never Used   • Alcohol use Yes      Comment: 2 DRINKS PER WEEK   • Drug use: No   • Sexual activity: Not on file    Social History     Social History Narrative        Family History   Problem Relation Age of Onset   • Heart disease Other    • Hypertension Other    • Malig Hyperthermia Neg Hx        Review of Systems      Physical Exam: 77 y.o. male  General Appearance:    Alert, cooperative, in no acute distress                    There were no vitals filed for this visit.     Head:    Normocephalic, without obvious abnormality, atraumatic   Eyes:            conjunctivae and sclerae normal, no pallor, corneas clear,    Ears:    Ears appear intact with no abnormalities noted   Throat:   No oral lesions, no thrush, oral mucosa moist   Neck:   No adenopathy, supple, trachea midline, no thyromegaly,    Back:     No kyphosis present, no scoliosis present, no skin lesions,      erythema or scars, no tenderness to percussion or                   palpation,   range of motion normal   Lungs:     Clear to auscultation,respirations regular, even and                  unlabored    Heart:    Regular rhythm and normal rate               Chest Wall:    No abnormalities observed   Abdomen:     Normal bowel sounds, no masses, no organomegaly, soft        non-tender, non-distended, no guarding, no rebound                tenderness   Rectal:     Deferred   Extremities:    Moves all extremities well, no edema,   no cyanosis, no redness   Pulses:   Pulses palpable and equal bilaterally   Skin:   No bleeding, bruising or rash   Lymph nodes:   No palpable adenopathy   Neurologic:   Appears neurologic intact             Assessment:  Patient Active Problem List   Diagnosis   • Current tear of meniscus of knee    Arthritis        Plan: All risks, benefits and alternatives were discussed.  Risks including to but not exclusive to anesthetic complications, including death, MI, CVA, infection, bleeding DVT, PE,  fracture, residual pain and need for future surgery.  Patient understood all and agrees to proceed.

## 2017-09-11 NOTE — PLAN OF CARE
Problem: Patient Care Overview (Adult)  Goal: Adult Individualization and Mutuality  Outcome: Outcome(s) achieved Date Met:  09/11/17 09/11/17 1241   Individualization   Patient Specific Interventions medicated for pain as note with good results

## 2017-09-11 NOTE — ANESTHESIA PREPROCEDURE EVALUATION
Anesthesia Evaluation     Patient summary reviewed and Nursing notes reviewed   NPO Solid Status: > 8 hours  NPO Liquid Status: > 2 hours     Airway   Mallampati: II  no difficulty expected  Dental - normal exam     Pulmonary     breath sounds clear to auscultation  (+) a smoker,   Cardiovascular     Rhythm: regular  Rate: normal    (+) hyperlipidemia      Neuro/Psych- negative ROS  GI/Hepatic/Renal/Endo - negative ROS     Musculoskeletal     (+) back pain,   Abdominal    Substance History - negative use     OB/GYN negative ob/gyn ROS         Other   (+) arthritis                                     Anesthesia Plan    ASA 2     general     intravenous induction   Anesthetic plan and risks discussed with patient.

## 2017-09-11 NOTE — OP NOTE
Operative Note      Facility: [facility]  Patient Name: Hu Harris  YOB: 1940  Date: 9/11/2017  Medical Record Number: [id number]      Pre-op Diagnosis: Right knee medial meniscus tear and OA      Post-op Diagnosis:   Right knee complex tear of the medial meniscus grade 4 changes on the patella grade 2 changes medial femoral condyle a large full-thickness lesion 1 cm x 1 cm lateral femoral condyle        Procedure(s):  Right KNEE ARTHROSCOPY WITH PARTICAL MEDIAL MENISECTOMY AND ARTHRITIS DEBRIDMENT/ chondroplasty of the patellofemoral joint mediofemoral condyle lateral femoral condyle    Surgeon(s):  Jimena Ho MD    Anesthesia: General  Anesthesiologist: Sheldon Vanegas MD  CRNA: Julianne Piña CRNA    Staff:   Circulator: Chana Evans RN  Scrub Person: Farrah Gee  Assistants :       Estimated Blood Loss: Less than 200 cc     Drains: None    Findings: See Dictation    Complications: None              Indication for procedure: This patient has had a several month history of knee pain and has an exam and an MRI which are consistent with meniscal pathology. They understand all options and wished to proceed with arthroscopy.      Description of procedure: The patient was taken to the operating room. They were placed supine on the operating room table. After induction of adequate LMA anesthesia, IV antibiotics the underwent exam under anesthesia was symmetric full range of motion. Nonsterile tourniquet was applied patient was placed in the thigh alejandra all prominent areas well padded and into the table dropped. The leg was prepped and draped in usual sterile fashion. Standard lateral incision was made with 11 blade. Blunt trocar penetrated into the joint scope followed in the evaluation began the patella appears to centrally within the trochlear groove he did have degenerative changes seen on both sides of the joint grade 2/reviewed and trochlear groove and grade 4 in the  patella.  The gutters were normal the suprapatellar pouch was normal.  I then entered the medial compartment under spinal needle localization direct visualization a medial portal was established.  He complex tear of the medial meniscus it was resected with various angles biters baskets motorized brigitte and ultimately the Apollo device.  He had a large flap of meniscus that had flipped posteriorly he grade 2 changes on the medial femoral condyle that were debrided.  The notch was normal with regard to the ACL and PCL.  Lateral compartment initially appeared normal however he was found to have a 1 cm x 1 cm loose cartilaginous fragment that was resected with biters and motorized shaver.  This point I turned my attention patellofemoral joint gently debrided both sides of this joint      At this point everything was thoroughly irrigated it was suctioned all 3 compartments, the gutters the suprapatellar pouch were all evaluated there was no further acute pathology seen.  Everything was thoroughly irrigated it was injected with Marcaine and Depo-Medrol.  The portals were closed with 3-0 nylon interrupted fashion.  Sterile dressings and Ace wraps were applied.  The patient tolerated the procedure well and was taken to recovery room in good condition.  All sponge and needle count were correct                    Date: 9/11/2017  Time: 11:08 AM

## 2017-09-11 NOTE — PLAN OF CARE
Problem: Patient Care Overview (Adult)  Goal: Plan of Care Review  Outcome: Outcome(s) achieved Date Met:  09/11/17 09/11/17 1240   Coping/Psychosocial Response Interventions   Plan Of Care Reviewed With patient;spouse   Patient Care Overview   Progress improving   Outcome Evaluation   Outcome Summary/Follow up Plan tolerating oral intake. ambulated to [facility] minimal help. readyy for discharge

## 2017-09-11 NOTE — PLAN OF CARE
Problem: Patient Care Overview (Adult)  Goal: Discharge Needs Assessment  Outcome: Ongoing (interventions implemented as appropriate)    09/11/17 6430   Discharge Needs Assessment   Concerns To Be Addressed denies needs/concerns at this time   Equipment Needed After Discharge (has walker at home)

## 2017-09-13 ENCOUNTER — TELEPHONE (OUTPATIENT)
Dept: ORTHOPEDIC SURGERY | Facility: CLINIC | Age: 77
End: 2017-09-13

## 2017-09-19 ENCOUNTER — OFFICE VISIT (OUTPATIENT)
Dept: ORTHOPEDIC SURGERY | Facility: CLINIC | Age: 77
End: 2017-09-19

## 2017-09-19 VITALS — TEMPERATURE: 98.4 F | HEIGHT: 72 IN

## 2017-09-19 DIAGNOSIS — Z98.890 S/P ARTHROSCOPY OF KNEE: Primary | ICD-10-CM

## 2017-09-19 PROCEDURE — 99024 POSTOP FOLLOW-UP VISIT: CPT | Performed by: ORTHOPAEDIC SURGERY

## 2017-09-19 NOTE — PROGRESS NOTES
"Right Knee Scope follow Up 1st Visit      Patient: Hu Harris        YOB: 1940      Chief Complaints: Right knee pain  Chief Complaint   Patient presents with   • Right Knee - Post-op   • Suture / Staple Removal            History of Present Illness: Pt is here f/u knee arthroscopyHe states he doing great he has no pain at all is very happy        Allergies:   Allergies   Allergen Reactions   • Iodine Swelling     WITH SHELLFISH   • Penicillins Other (See Comments)     TOLD SO YEARS AGO   • Shellfish-Derived Products Other (See Comments)     SWELLING       Medications:   Home Medications:  Current Outpatient Prescriptions on File Prior to Visit   Medication Sig   • aspirin 325 MG tablet Take 325 mg by mouth Every Morning.   • atorvastatin (LIPITOR) 40 MG tablet Take 20 mg by mouth Every Night.   • clonazePAM (KlonoPIN) 0.5 MG tablet Take 0.5 mg by mouth At Night As Needed.   • levothyroxine (SYNTHROID, LEVOTHROID) 88 MCG tablet Take 88 mcg by mouth Every Morning.   • meloxicam (MOBIC) 7.5 MG tablet Take 7.5 mg by mouth Every Morning.   • Multiple Vitamins-Minerals (MULTIVITAMIN WITH MINERALS) tablet Take 1 tablet by mouth Every Morning.   • tamsulosin (FLOMAX) 0.4 MG capsule 24 hr capsule Take 1 capsule by mouth Every Night.   • HYDROcodone-acetaminophen (NORCO) 7.5-325 MG per tablet 1-2 Oral Q4H to Q6H PRN severe pain     No current facility-administered medications on file prior to visit.      Current Medications:  Scheduled Meds:  Continuous Infusions:  No current facility-administered medications for this visit.   PRN Meds:.          Physical Exam: 77 y.o. male  General Appearance:    Alert, cooperative, in no acute distress                 Vitals:    09/19/17 1412   Temp: 98.4 °F (36.9 °C)   TempSrc: Temporal Artery    Height: 72\" (182.9 cm)      Patient is alert and oriented ×3 no acute distress normal mood physical exam.  Physical exam of the knee, incisions looked good there is no erythema, " calf is soft and non-tender.  No sign or sx of DVT      Assessment  S/P knee scope.  I did review intraoperative findings and arthroscopic pictures with the patient.          Plan: To remove sutures today place Steri-Strips and start into  physical therapy and I will have thrm follow up in 4 weeks.

## 2017-11-09 ENCOUNTER — OFFICE VISIT (OUTPATIENT)
Dept: ORTHOPEDIC SURGERY | Facility: CLINIC | Age: 77
End: 2017-11-09

## 2017-11-09 VITALS — BODY MASS INDEX: 24.16 KG/M2 | WEIGHT: 178.4 LBS | TEMPERATURE: 98.4 F | HEIGHT: 72 IN

## 2017-11-09 DIAGNOSIS — Z98.890 STATUS POST ARTHROSCOPY OF KNEE: Primary | ICD-10-CM

## 2017-11-09 RX ORDER — METHYLPREDNISOLONE ACETATE 80 MG/ML
80 INJECTION, SUSPENSION INTRA-ARTICULAR; INTRALESIONAL; INTRAMUSCULAR; SOFT TISSUE
Status: COMPLETED | OUTPATIENT
Start: 2017-11-09 | End: 2017-11-09

## 2017-11-09 RX ORDER — BUPIVACAINE HYDROCHLORIDE 5 MG/ML
4 INJECTION, SOLUTION EPIDURAL; INTRACAUDAL
Status: COMPLETED | OUTPATIENT
Start: 2017-11-09 | End: 2017-11-09

## 2017-11-09 RX ADMIN — BUPIVACAINE HYDROCHLORIDE 4 ML: 5 INJECTION, SOLUTION EPIDURAL; INTRACAUDAL at 08:53

## 2017-11-09 RX ADMIN — METHYLPREDNISOLONE ACETATE 80 MG: 80 INJECTION, SUSPENSION INTRA-ARTICULAR; INTRALESIONAL; INTRAMUSCULAR; SOFT TISSUE at 08:53

## 2017-11-09 NOTE — PROGRESS NOTES
"Knee Scope follow Up       Patient: Hu Harris        YOB: 1940      Chief Complaints: knee pain right      History of Present Illness: Pt is here f/u knee arthroscopy he is 8 weeks out doing well he is better than he was before surgery by a long shot.  He sought a little bit of achiness about the medial aspect.        Allergies:   Allergies   Allergen Reactions   • Iodine Swelling     WITH SHELLFISH   • Penicillins Other (See Comments)     TOLD SO YEARS AGO   • Shellfish-Derived Products Other (See Comments)     SWELLING       Medications:   Home Medications:  Current Outpatient Prescriptions on File Prior to Visit   Medication Sig   • aspirin 325 MG tablet Take 325 mg by mouth Every Morning.   • atorvastatin (LIPITOR) 40 MG tablet Take 20 mg by mouth Every Night.   • clonazePAM (KlonoPIN) 0.5 MG tablet Take 0.5 mg by mouth At Night As Needed.   • levothyroxine (SYNTHROID, LEVOTHROID) 88 MCG tablet Take 88 mcg by mouth Every Morning.   • meloxicam (MOBIC) 7.5 MG tablet Take 7.5 mg by mouth Every Morning.   • Multiple Vitamins-Minerals (MULTIVITAMIN WITH MINERALS) tablet Take 1 tablet by mouth Every Morning.   • tamsulosin (FLOMAX) 0.4 MG capsule 24 hr capsule Take 1 capsule by mouth Every Night.   • [DISCONTINUED] HYDROcodone-acetaminophen (NORCO) 7.5-325 MG per tablet 1-2 Oral Q4H to Q6H PRN severe pain     No current facility-administered medications on file prior to visit.      Current Medications:  Scheduled Meds:  Continuous Infusions:  No current facility-administered medications for this visit.   PRN Meds:.          Physical Exam: 77 y.o. male  General Appearance:    Alert, cooperative, in no acute distress                 Vitals:    11/09/17 0836   Temp: 98.4 °F (36.9 °C)   TempSrc: Temporal Artery    Weight: 178 lb 6.4 oz (80.9 kg)   Height: 72\" (182.9 cm)      Patient is alert and oriented ×3 no acute distress normal mood physical exam.  Physical exam of the knee, incisions looked good " there is no erythema, calf is soft and non-tender.  No sign or sx of DVT    Large Joint Arthrocentesis  Date/Time: 11/9/2017 8:53 AM  Consent given by: patient  Site marked: site marked  Timeout: Immediately prior to procedure a time out was called to verify the correct patient, procedure, equipment, support staff and site/side marked as required   Supporting Documentation  Indications: pain   Procedure Details  Location: knee - R knee  Needle size: 25 G  Approach: anteromedial  Medications administered: 80 mg methylPREDNISolone acetate 80 MG/ML; 4 mL bupivacaine (PF) 0.5 %  Patient tolerance: patient tolerated the procedure well with no immediate complications          Assessment  S/P knee scope.  Overall doing well. Little bit of achiness we talked about a steroid injection he would like to do that        Plan: Continue with strengthening, progression of activities we'll inject the knee today and he will follow-up as needed

## 2019-09-04 ENCOUNTER — OFFICE VISIT (OUTPATIENT)
Dept: ORTHOPEDIC SURGERY | Facility: CLINIC | Age: 79
End: 2019-09-04

## 2019-09-04 VITALS — TEMPERATURE: 97.3 F | WEIGHT: 173 LBS | HEIGHT: 71 IN | BODY MASS INDEX: 24.22 KG/M2

## 2019-09-04 DIAGNOSIS — M54.2 CERVICAL SPINE PAIN: ICD-10-CM

## 2019-09-04 DIAGNOSIS — M54.2 NECK PAIN: Primary | ICD-10-CM

## 2019-09-04 PROCEDURE — 72040 X-RAY EXAM NECK SPINE 2-3 VW: CPT | Performed by: ORTHOPAEDIC SURGERY

## 2019-09-04 PROCEDURE — 99203 OFFICE O/P NEW LOW 30 MIN: CPT | Performed by: ORTHOPAEDIC SURGERY

## 2019-09-04 RX ORDER — PRAVASTATIN SODIUM 20 MG
20 TABLET ORAL NIGHTLY
Refills: 3 | COMMUNITY
Start: 2019-06-14

## 2019-09-04 NOTE — PROGRESS NOTES
New patient or new problem visit    Chief Complaint   Patient presents with   • Cervical Spine - Establish Care       HPI: He complains of neck pain, nonradiating going on in the current intensity for about 3 months severe stabbing aching associated with crepitus.  It originally started out his vertigo which improved with postural exercises but the neck pain part is persisted he denies balance difficulties bowel or bladder complaints    PFSH: See chart- reviewed    Review of Systems   Musculoskeletal: Positive for neck pain.     No balance difficulties no bowel or bladder complaints  PE: Constitutional: Vital signs above-noted.  Awake, alert and oriented    Psychiatric: Affect and insight do not appear grossly disturbed.    Pulmonary: Breathing is unlabored, color is good.    Skin: Warm, dry and normal turgor    Cardiac: Radial pulses intact.  No edema.    Musculoskeletal:  Posture is unremarkable to coronal and sagittal inspection.  Motion appears undisturbed.  The skin about the area is intact.  There is no palpable or visible deformity.  There is no local spasm. There is mild tenderness to percussion and palpation of the spine.     Neurologic:  In the bilateral upper extremities there is no evidence of atrophy.  Motor function is undisturbed in shoulder abduction, elbow flexion, wrist extension, finger extension, triceps extension, or finger abduction.  Sensation appears symmetrically intact to light touch.  Reflexes are 2+ and symmetrical in the biceps, brachioradialis, and triceps. Jade test is negative.  Gait appears undisturbed.  Spurling test is negative.      MEDICAL DECISION MAKING    XRAY: Plain film x-rays of cervical spine show multilevel spondylosis, spondylolisthesis, but excellent maintenance of lordosis and satisfactory coronal posture.  No comparison views are available.    Other: n/a    Impression: Cervical spondylosis without radiculopathy    Plan: For now physical therapy traction and PRN  follow-up.  If he fails to improve then will consider an MRI scan with an eye toward epidural injection.

## 2020-02-18 ENCOUNTER — OFFICE VISIT (OUTPATIENT)
Dept: ORTHOPEDIC SURGERY | Facility: CLINIC | Age: 80
End: 2020-02-18

## 2020-02-18 VITALS — BODY MASS INDEX: 24.43 KG/M2 | WEIGHT: 180.4 LBS | TEMPERATURE: 97.5 F | HEIGHT: 72 IN

## 2020-02-18 DIAGNOSIS — M75.51 SUBACROMIAL BURSITIS OF RIGHT SHOULDER JOINT: ICD-10-CM

## 2020-02-18 DIAGNOSIS — M54.2 NECK PAIN: Primary | ICD-10-CM

## 2020-02-18 PROCEDURE — 99213 OFFICE O/P EST LOW 20 MIN: CPT | Performed by: ORTHOPAEDIC SURGERY

## 2020-02-18 PROCEDURE — 73030 X-RAY EXAM OF SHOULDER: CPT | Performed by: ORTHOPAEDIC SURGERY

## 2020-02-18 PROCEDURE — 20610 DRAIN/INJ JOINT/BURSA W/O US: CPT | Performed by: ORTHOPAEDIC SURGERY

## 2020-02-18 RX ORDER — METHYLPREDNISOLONE ACETATE 80 MG/ML
80 INJECTION, SUSPENSION INTRA-ARTICULAR; INTRALESIONAL; INTRAMUSCULAR; SOFT TISSUE
Status: COMPLETED | OUTPATIENT
Start: 2020-02-18 | End: 2020-02-18

## 2020-02-18 RX ADMIN — METHYLPREDNISOLONE ACETATE 80 MG: 80 INJECTION, SUSPENSION INTRA-ARTICULAR; INTRALESIONAL; INTRAMUSCULAR; SOFT TISSUE at 11:03

## 2020-02-18 NOTE — PROGRESS NOTES
He has some continued neck pain I also complains of right shoulder subacromial pain and impingement type with symptoms.  No balance difficulties on exam brachial radialis is weak bilaterally but good reflexes otherwise and completely normal sensation and strength.  X-rays of the cervical spine were reviewed and these show multilevel spondylosis but fairly well-maintained lordosis couple of areas of spondylolisthesis are noted.  He had questions about how safe it is to perform vigorous activities with his neck.  I think an MRI scan is in order to review this and additionally I injected the right shoulder with Marcaine and Depo-Medrol under sterile technique.  I will call him with results of the MRI.      Large Joint Arthrocentesis: R subacromial bursa  Date/Time: 2/18/2020 11:03 AM  Consent given by: patient  Site marked: site marked  Timeout: Immediately prior to procedure a time out was called to verify the correct patient, procedure, equipment, support staff and site/side marked as required   Supporting Documentation  Indications: pain   Procedure Details  Location: shoulder - R subacromial bursa  Needle size: 25 G  Medications administered: 4 mL lidocaine (cardiac); 80 mg methylPREDNISolone acetate 80 MG/ML  Patient tolerance: patient tolerated the procedure well with no immediate complications

## 2020-02-26 ENCOUNTER — APPOINTMENT (OUTPATIENT)
Dept: MRI IMAGING | Facility: HOSPITAL | Age: 80
End: 2020-02-26

## 2020-09-11 ENCOUNTER — OFFICE VISIT (OUTPATIENT)
Dept: ORTHOPEDIC SURGERY | Facility: CLINIC | Age: 80
End: 2020-09-11

## 2020-09-11 VITALS — WEIGHT: 173.5 LBS | TEMPERATURE: 97.8 F | HEIGHT: 72 IN | BODY MASS INDEX: 23.5 KG/M2

## 2020-09-11 DIAGNOSIS — M17.11 PRIMARY LOCALIZED OSTEOARTHROSIS OF RIGHT LOWER LEG: ICD-10-CM

## 2020-09-11 DIAGNOSIS — M25.561 RIGHT KNEE PAIN, UNSPECIFIED CHRONICITY: Primary | ICD-10-CM

## 2020-09-11 PROCEDURE — 99213 OFFICE O/P EST LOW 20 MIN: CPT | Performed by: ORTHOPAEDIC SURGERY

## 2020-09-11 PROCEDURE — 73562 X-RAY EXAM OF KNEE 3: CPT | Performed by: ORTHOPAEDIC SURGERY

## 2020-09-11 PROCEDURE — 20610 DRAIN/INJ JOINT/BURSA W/O US: CPT | Performed by: ORTHOPAEDIC SURGERY

## 2020-09-11 RX ORDER — METHYLPREDNISOLONE ACETATE 80 MG/ML
80 INJECTION, SUSPENSION INTRA-ARTICULAR; INTRALESIONAL; INTRAMUSCULAR; SOFT TISSUE
Status: COMPLETED | OUTPATIENT
Start: 2020-09-11 | End: 2020-09-11

## 2020-09-11 RX ADMIN — METHYLPREDNISOLONE ACETATE 80 MG: 80 INJECTION, SUSPENSION INTRA-ARTICULAR; INTRALESIONAL; INTRAMUSCULAR; SOFT TISSUE at 10:11

## 2020-09-11 NOTE — PROGRESS NOTES
New Right Knee      Patient: Hu Harris        YOB: 1940    Medical Record Number: [id number]        Chief Complaints: right knee pain      History of Present Illness: This is a 80-year-old male who I last saw in 2017 where he underwent knee arthroscopy he is doing well until recently over the last month or so started noticing increasing pain particularly if he sits for a period of time he noticed some stiffness and some achiness primarily medial.  No no history injury change in activity he can recall current symptoms are moderate intermittent aching worse with certain positions and activity somewhat better with rest he is self-employed in  past medical history is more for thyroid disease high cholesterol and arthritis        Allergies:   Allergies   Allergen Reactions   • Iodine Swelling     WITH SHELLFISH   • Penicillins Other (See Comments)     TOLD SO YEARS AGO   • Shellfish-Derived Products Other (See Comments)     SWELLING       Medications:   Home Medications:  Current Outpatient Medications on File Prior to Visit   Medication Sig   • aspirin 325 MG tablet Take 325 mg by mouth Every Morning.   • clonazePAM (KlonoPIN) 0.5 MG tablet Take 0.5 mg by mouth At Night As Needed.   • levothyroxine (SYNTHROID, LEVOTHROID) 88 MCG tablet Take 88 mcg by mouth Every Morning.   • meloxicam (MOBIC) 7.5 MG tablet Take 7.5 mg by mouth Every Morning.   • pravastatin (PRAVACHOL) 20 MG tablet TK 1 T PO QAM   • [DISCONTINUED] atorvastatin (LIPITOR) 40 MG tablet Take 20 mg by mouth Every Night.   • [DISCONTINUED] Multiple Vitamins-Minerals (MULTIVITAMIN WITH MINERALS) tablet Take 1 tablet by mouth Every Morning.   • [DISCONTINUED] tamsulosin (FLOMAX) 0.4 MG capsule 24 hr capsule Take 1 capsule by mouth Every Night.     No current facility-administered medications on file prior to visit.      Current Medications:  Scheduled Meds:  Continuous Infusions:  No current facility-administered medications for  "this visit.   PRN Meds:.    Past Medical History:   Diagnosis Date   • Arthritis    • Back pain    • Disease of thyroid gland    • High cholesterol    • Knee pain, right     TORN MENISCUS        Past Surgical History:   Procedure Laterality Date   • FOOT SURGERY Bilateral    • HERNIA REPAIR     • KNEE ARTHROSCOPY Right 2017    Procedure: KNEE ARTHROSCOPY WITH PARTICAL MEDIAL MENISECTOMY AND ARTHRITIS DEBRIDMENT;  Surgeon: Jimena Ho MD;  Location[place] [facility] [place];  Service:    • KNEE SURGERY Left     partial replacement         Social History     Occupational History   • Not on file   Tobacco Use   • Smoking status: Former Smoker     Packs/day: 0.50     Years: 12.00     Pack years: 6.00     Types: Cigarettes     Last attempt to quit: 1965     Years since quittin.7   • Smokeless tobacco: Never Used   Substance and Sexual Activity   • Alcohol use: Yes     Comment: 2 DRINKS PER WEEK   • Drug use: No   • Sexual activity: Defer      Social History     Social History Narrative   • Not on file        Family History   Problem Relation Age of Onset   • Heart disease Other    • Hypertension Other    • Malig Hyperthermia Neg Hx              Review of Systems: 14 point review of systems are remarkable for the knee pain only the remainder negative per the patient    Review of Systems      Physical Exam: 80 y.o. male  General Appearance:    Alert, cooperative, in no acute distress                   Vitals:    20 0941   Temp: 97.8 °F (36.6 °C)   TempSrc: Temporal   Weight: 78.7 kg (173 lb 8 oz)   Height: 182.9 cm (72\")   PainSc:   3   PainLoc: Knee      Patient is alert and read ×3 no acute distress appears her above-listed at height weight and age.  Affect is normal respiratory rate is normal unlabored. Heart rate regular rate rhythm, sclera, dentition and hearing are normal for the purpose of this exam.        Ortho Exam  Physical exam of the right  knee reveals no effusion no redness.  The patient " does have tenderness about the medial l joint line.  No tenderness about the lateral l joint line.  A negative bounce home and a positive l medial Mihai.    Patient has a stable ligamentous exam.  The patient has a negative Lachman and negative anterior drawer and a negative pivot shift.  Quads are reasonable and symmetric bilaterally.  Calf is soft and nontender.  There is no overlying skin changes no lymphedema lymphadenopathy.  Patient has good hip range of motion full symmetric and asymptomatic and a normal ankle exam.  She has good distal pulses and sensation distally is intact      Large Joint Arthrocentesis: R knee  Date/Time: 9/11/2020 10:11 AM  Consent given by: patient  Site marked: site marked  Timeout: Immediately prior to procedure a time out was called to verify the correct patient, procedure, equipment, support staff and site/side marked as required   Supporting Documentation  Indications: pain   Procedure Details  Location: knee - R knee  Needle gauge: 21.  Approach: anteromedial  Medications administered: 80 mg methylPREDNISolone acetate 80 MG/ML; 4 mL lidocaine (cardiac)  Patient tolerance: patient tolerated the procedure well with no immediate complications                   Radiology:   AP, Lateral and merchant views of the right knee  were ordered/reviewed to evauateknee pain.  I have compared to x-rays from 2017 he does have evidence of a unicompartmental knee replacement on the left he has good maintenance of his joint space medially and laterally that is unchanged he has mild patellofemoral OA.  Also reviewed his intraoperative operative pictures which do show a large cartilage defect in the medial femoral condyle  Imaging Results (Most Recent)     Procedure Component Value Units Date/Time    XR Knee 3 View Right [[id number]] Resulted:  09/11/20 0928     Updated:  09/11/20 0936    Impression:       Ordering physician's impression is located in the Encounter Note dated 09/11/20. X-ray  performed in the DR room.          Assessment/Plan:      Right knee pain.  I think the x-rays are deceiving and that they still show good maintenance of his joint space.  We know at the time of surgery that he has a large defect in that medial femoral condyle which is really what I think is bothering him now.  Think he benefit from an injection which he was agreeable to do we also talked about Synvisc/Monovisc we will try to get approval for that he understands his options going forward.  We will continue to work on strengthening

## 2021-01-21 ENCOUNTER — IMMUNIZATION (OUTPATIENT)
Dept: VACCINE CLINIC | Facility: HOSPITAL | Age: 81
End: 2021-01-21

## 2021-01-21 PROCEDURE — 91300 HC SARSCOV02 VAC 30MCG/0.3ML IM: CPT | Performed by: THORACIC SURGERY (CARDIOTHORACIC VASCULAR SURGERY)

## 2021-01-21 PROCEDURE — 0001A: CPT | Performed by: THORACIC SURGERY (CARDIOTHORACIC VASCULAR SURGERY)

## 2021-02-11 ENCOUNTER — IMMUNIZATION (OUTPATIENT)
Dept: VACCINE CLINIC | Facility: HOSPITAL | Age: 81
End: 2021-02-11

## 2021-02-11 PROCEDURE — 0002A: CPT | Performed by: THORACIC SURGERY (CARDIOTHORACIC VASCULAR SURGERY)

## 2021-02-11 PROCEDURE — 91300 HC SARSCOV02 VAC 30MCG/0.3ML IM: CPT | Performed by: THORACIC SURGERY (CARDIOTHORACIC VASCULAR SURGERY)

## 2021-06-03 NOTE — PROGRESS NOTES
New Right Shoulder      Patient: Hu Harris        YOB: 1940    Medical Record Number: [id number]        Chief Complaints: right shoulder pain      History of Present Illness: This is a 80-year-old right-hand-dominant male who presents complaining of right shoulder pain he states is been ongoing for several years much worse recently has had loss of range of motion and pain with everyday activities his symptoms are shooting grinding aching worse with activity somewhat better with rest and over-the-counter medications.  His past medical history is for high cholesterol thyroid disease arthritis    Allergies:   Allergies   Allergen Reactions   • Iodine Swelling     WITH SHELLFISH   • Penicillins Other (See Comments)     TOLD SO YEARS AGO   • Shellfish-Derived Products Other (See Comments)     SWELLING       Medications:   Home Medications:  Current Outpatient Medications on File Prior to Visit   Medication Sig   • aspirin 325 MG tablet Take 325 mg by mouth Every Morning.   • clonazePAM (KlonoPIN) 0.5 MG tablet Take 0.5 mg by mouth At Night As Needed.   • levothyroxine (SYNTHROID, LEVOTHROID) 88 MCG tablet Take 88 mcg by mouth Every Morning.   • naproxen sodium (ALEVE) 220 MG tablet Take 220 mg by mouth 2 (Two) Times a Day As Needed.   • pravastatin (PRAVACHOL) 20 MG tablet TK 1 T PO QAM   • Cyanocobalamin (B-12) 1000 MCG sublingual tablet DISSOLVE 1 TABLET UNDER THE TONGUE EVERY DAY   • [DISCONTINUED] meloxicam (MOBIC) 7.5 MG tablet Take 7.5 mg by mouth Every Morning.     No current facility-administered medications on file prior to visit.     Current Medications:  Scheduled Meds:  Continuous Infusions:No current facility-administered medications for this visit.    PRN Meds:.    Past Medical History:   Diagnosis Date   • Arthritis    • Back pain    • Disease of thyroid gland    • High cholesterol    • Knee pain, right     TORN MENISCUS        Past Surgical History:   Procedure Laterality Date   • FOOT  "SURGERY Bilateral    • HERNIA REPAIR     • KNEE ARTHROSCOPY Right 2017    Procedure: KNEE ARTHROSCOPY WITH PARTICAL MEDIAL MENISECTOMY AND ARTHRITIS DEBRIDMENT;  Surgeon: Jimena Ho MD;  Location: [facility] OR [facility] [place];  Service:    • KNEE SURGERY Left     partial replacement         Social History     Occupational History   • Not on file   Tobacco Use   • Smoking status: Former Smoker     Packs/day: 0.50     Years: 12.00     Pack years: 6.00     Types: Cigarettes     Quit date: 1965     Years since quittin.4   • Smokeless tobacco: Never Used   Substance and Sexual Activity   • Alcohol use: Yes     Comment: 2 DRINKS PER WEEK   • Drug use: No   • Sexual activity: Defer      Social History     Social History Narrative   • Not on file        Family History   Problem Relation Age of Onset   • Heart disease Other    • Hypertension Other    • Malig Hyperthermia Neg Hx              Review of Systems: 14 point review of systems are remarkable for shoulder pain only the remainder negative per the patient    Review of Systems      Physical Exam: 80 y.o. male  General Appearance:    Alert, cooperative, in no acute distress                   Vitals:    21 0909   Temp: 96.6 °F (35.9 °C)   TempSrc: Temporal   Weight: 78.7 kg (173 lb 8 oz)   Height: 182.9 cm (72\")      Patient is alert and read ×3 no acute distress appears her above-listed at height weight and age.  Affect is normal respiratory rate is normal unlabored. Heart rate regular rate rhythm, sclera, dentition and hearing are normal for the purpose of this exam.    Ortho Exam Physical exam the right shoulder reveals no overlying skin changes no lymphedema lymphadenopathy the patient can actively flex to about 150 passively I get them to 160 abduction is similar external rotation is 40 internal rotation to there buttock.  Rotator cuff strength is 4+ over 5 with isometric strength testing no overlying skin changes.  Patient has reasonable cervical " range of motion for their age no radicular symptoms and a normal elbow exam.  There are good distal pulses.    Procedures          Radiology:   AP, Scapular Y and Axillary Lateral of the right shoulder were ordered/reviewed to evauate shoulder pain.  I have no comparative films he has degenerative change glenohumeral joint with near complete loss of joint space and flattening of the humeral head low  Imaging Results (Most Recent)     Procedure Component Value Units Date/Time    XR Shoulder 2+ View Right [[id number]] Resulted: 06/04/21 0655     Updated: 06/04/21 0851    Impression:      Ordering physician's impression is located in the Encounter Note dated 06/04/21. X-ray performed in the DR room.          Assessment/Plan: Right shoulder pain which is from degenerative changes he has loss of joint space and flattening humeral head we had a long discussion regarding options including steroid injection activity modification and ultimate shoulder replacement we did talk a lot about shoulder replacement what that would entail at this point he wants to try conservative we will try an injection if he fails to get improvement then we will consider arthroplasty  Cortisone Injection. See procedure note.  Cortisone Injection for DIAGNOSTIC and THERAPUTIC purposes.

## 2021-06-04 ENCOUNTER — OFFICE VISIT (OUTPATIENT)
Dept: ORTHOPEDIC SURGERY | Facility: CLINIC | Age: 81
End: 2021-06-04

## 2021-06-04 VITALS — WEIGHT: 173.5 LBS | TEMPERATURE: 96.6 F | HEIGHT: 72 IN | BODY MASS INDEX: 23.5 KG/M2

## 2021-06-04 DIAGNOSIS — M75.51 SUBACROMIAL BURSITIS OF RIGHT SHOULDER JOINT: Primary | ICD-10-CM

## 2021-06-04 DIAGNOSIS — M19.011 PRIMARY LOCALIZED OSTEOARTHROSIS OF RIGHT SHOULDER REGION: ICD-10-CM

## 2021-06-04 PROCEDURE — 99213 OFFICE O/P EST LOW 20 MIN: CPT | Performed by: ORTHOPAEDIC SURGERY

## 2021-06-04 PROCEDURE — 73030 X-RAY EXAM OF SHOULDER: CPT | Performed by: ORTHOPAEDIC SURGERY

## 2021-06-04 RX ORDER — MAGNESIUM 200 MG
TABLET ORAL
COMMUNITY
Start: 2021-05-25 | End: 2021-12-30

## 2021-06-04 RX ORDER — MELOXICAM 15 MG/1
TABLET ORAL
Qty: 30 TABLET | Refills: 0 | Status: SHIPPED | OUTPATIENT
Start: 2021-06-04 | End: 2021-06-04

## 2021-06-04 RX ORDER — MELOXICAM 15 MG/1
TABLET ORAL
Qty: 30 TABLET | Refills: 0 | Status: SHIPPED | OUTPATIENT
Start: 2021-06-04 | End: 2021-07-02

## 2021-06-04 RX ORDER — NAPROXEN SODIUM 220 MG
220 TABLET ORAL 2 TIMES DAILY PRN
COMMUNITY
End: 2021-12-30

## 2021-07-02 RX ORDER — MELOXICAM 15 MG/1
TABLET ORAL
Qty: 30 TABLET | Refills: 0 | Status: SHIPPED | OUTPATIENT
Start: 2021-07-02

## 2021-07-02 NOTE — TELEPHONE ENCOUNTER
Can you please find out if patient is taking the meloxicam daily.  I got an alert that he is also taking Aleve.  Would like to clarify that before refilling the prescription.  Thank you

## 2021-07-02 NOTE — TELEPHONE ENCOUNTER
Patient said that he would like meloxicam.  He does not take Aleve with meloxicam.  He has been alternating days (meloxicam one day, aleve the next, and so on)  Is that ok?  He also wanted to know if taking aspirin was ok?

## 2021-07-02 NOTE — TELEPHONE ENCOUNTER
I think that is fine to alternate. Can take a baby ASA 81mg with that but wouldn't do if taking more ASA than that.    I do think Dr. Ho would recommend that if he needs to con't taking it daily, he should follow up with his PCP for additional refills so they can monitor appropriate labs etc.    I sent the Meloxicam.

## 2021-07-02 NOTE — TELEPHONE ENCOUNTER
Called and spoke with patient, she will come in for evaluation, X-rays and further discussion of options.

## 2021-07-06 RX ORDER — MELOXICAM 15 MG/1
TABLET ORAL
Qty: 90 TABLET | OUTPATIENT
Start: 2021-07-06

## 2021-07-06 NOTE — TELEPHONE ENCOUNTER
Please let the patient know we refilled for 30 days. He is taking it every other day alternating with Aleve so should last for 60. Typically, long term use of NSAIDS should be managed by PCP- if he needs a 90 day RX would request from PCP.

## 2021-08-30 RX ORDER — MELOXICAM 15 MG/1
TABLET ORAL
Qty: 30 TABLET | Refills: 0 | OUTPATIENT
Start: 2021-08-30

## 2021-09-07 ENCOUNTER — TELEPHONE (OUTPATIENT)
Dept: ORTHOPEDIC SURGERY | Facility: CLINIC | Age: 81
End: 2021-09-07

## 2021-09-07 NOTE — TELEPHONE ENCOUNTER
DELETE AFTER REVIEWING: Telephone encounter to be sent to the clinical pool     Caller: KASSIE CHINCHILLA    Relationship to patient: SELF    Best call back number: [phone]    Chief complaint: RESCHEDULING INJECTION    Type of visit: INJECTION    Requested date: LATER 09/10 (3:30-5:00) OR SOMETIME NEXT WEEK    If rescheduling, when is the original appointment: 09/10    Additional notes: PATIENT WOULD LIKE TO SEE IF THERE ARE ANY OTHER AVAILABILITIES.

## 2021-09-10 ENCOUNTER — CLINICAL SUPPORT (OUTPATIENT)
Dept: ORTHOPEDIC SURGERY | Facility: CLINIC | Age: 81
End: 2021-09-10

## 2021-09-10 VITALS — TEMPERATURE: 97.7 F | WEIGHT: 172 LBS | BODY MASS INDEX: 24.08 KG/M2 | HEIGHT: 71 IN

## 2021-09-10 DIAGNOSIS — M19.019 GLENOHUMERAL ARTHRITIS: Primary | ICD-10-CM

## 2021-09-10 PROCEDURE — 20610 DRAIN/INJ JOINT/BURSA W/O US: CPT | Performed by: ORTHOPAEDIC SURGERY

## 2021-09-10 RX ORDER — METHYLPREDNISOLONE ACETATE 80 MG/ML
80 INJECTION, SUSPENSION INTRA-ARTICULAR; INTRALESIONAL; INTRAMUSCULAR; SOFT TISSUE
Status: COMPLETED | OUTPATIENT
Start: 2021-09-10 | End: 2021-09-10

## 2021-09-10 RX ADMIN — METHYLPREDNISOLONE ACETATE 80 MG: 80 INJECTION, SUSPENSION INTRA-ARTICULAR; INTRALESIONAL; INTRAMUSCULAR; SOFT TISSUE at 15:04

## 2021-09-10 NOTE — PROGRESS NOTES
Patient: Hu Harris  YOB: 1940  Date of Service: 9/10/2021    Chief Complaints:   Chief Complaint   Patient presents with   • Right Shoulder - Follow-up, Pain     Right shoulder pain  Subjective:    History of Present Illness: Pt is seen in the office today with complaints of right shoulder pain he has known degenerative changes gets intermittent injections does well with those  Chief Complaint   Patient presents with   • Right Shoulder - Follow-up, Pain   .          Allergies:   Allergies   Allergen Reactions   • Iodine Swelling     WITH SHELLFISH   • Penicillins Other (See Comments)     TOLD SO YEARS AGO   • Shellfish-Derived Products Other (See Comments)     SWELLING       Medications:   Home Medications:  Current Outpatient Medications on File Prior to Visit   Medication Sig   • clonazePAM (KlonoPIN) 0.5 MG tablet Take 0.5 mg by mouth At Night As Needed.   • Cyanocobalamin (B-12) 1000 MCG sublingual tablet DISSOLVE 1 TABLET UNDER THE TONGUE EVERY DAY   • levothyroxine (SYNTHROID, LEVOTHROID) 88 MCG tablet Take 88 mcg by mouth Every Morning.   • meloxicam (MOBIC) 15 MG tablet TAKE 1 TABLET BY MOUTH DAILY WITH FOOD   • naproxen sodium (ALEVE) 220 MG tablet Take 220 mg by mouth 2 (Two) Times a Day As Needed.   • pravastatin (PRAVACHOL) 20 MG tablet TK 1 T PO QAM   • Aspirin 81 MG capsule Take 325 mg by mouth Every Morning.     No current facility-administered medications on file prior to visit.     Current Medications:  Scheduled Meds:  Continuous Infusions:No current facility-administered medications for this visit.    PRN Meds:.    I have reviewed the patient's medical history in detail and updated the computerized patient record.  Review and summarization of old records include:    Past Medical History:   Diagnosis Date   • Arthritis    • Back pain    • Disease of thyroid gland    • High cholesterol    • Knee pain, right     TORN MENISCUS        Past Surgical History:   Procedure Laterality  Date   • FOOT SURGERY Bilateral    • HERNIA REPAIR     • KNEE ARTHROSCOPY Right 2017    Procedure: KNEE ARTHROSCOPY WITH PARTICAL MEDIAL MENISECTOMY AND ARTHRITIS DEBRIDMENT;  Surgeon: Jimena Ho MD;  Location: [facility] OR [facility] [place];  Service:    • KNEE SURGERY Left     partial replacement         Social History     Occupational History   • Not on file   Tobacco Use   • Smoking status: Former Smoker     Packs/day: 0.50     Years: 12.00     Pack years: 6.00     Types: Cigarettes     Quit date:      Years since quittin.7   • Smokeless tobacco: Never Used   Vaping Use   • Vaping Use: Never used   Substance and Sexual Activity   • Alcohol use: Yes     Comment: 2 DRINKS PER WEEK   • Drug use: No   • Sexual activity: Defer      Social History     Social History Narrative   • Not on file        Family History   Problem Relation Age of Onset   • Heart disease Other    • Hypertension Other    • Malig Hyperthermia Neg Hx        ROS: 14 point review of systems was performed and was negative except for documented findings in HPI and today's encounter.     Allergies:   Allergies   Allergen Reactions   • Iodine Swelling     WITH SHELLFISH   • Penicillins Other (See Comments)     TOLD SO YEARS AGO   • Shellfish-Derived Products Other (See Comments)     SWELLING     Constitutional:  Denies fever, shaking or chills   Eyes:  Denies change in visual acuity   HENT:  Denies nasal congestion or sore throat   Respiratory:  Denies cough or shortness of breath   Cardiovascular:  Denies chest pain or severe LE edema   GI:  Denies abdominal pain, nausea, vomiting, bloody stools or diarrhea   Musculoskeletal:  Numbness, tingling, or loss of motor function only as noted above in history of present illness.  : Denies painful urination or hematuria  Integument:  Denies rash, lesion or ulceration   Neurologic:  Denies headache or focal weakness  Endocrine:  Denies lymphadenopathy  Psych:  Denies confusion or change in mental  status   Hem:  Denies active bleeding      Physical Exam: 81 y.o. male  Wt Readings from Last 3 Encounters:   09/10/21 78 kg (172 lb)   08/16/21 78.5 kg (173 lb)   06/04/21 78.7 kg (173 lb 8 oz)       Body mass index is 23.99 kg/m².  Facility age limit for growth percentiles is 20 years.  Vitals:    09/10/21 1500   Temp: 97.7 °F (36.5 °C)     Vital signs reviewed.   General Appearance:    Alert, cooperative, in no acute distress                    Ortho exam    Exam is unchanged         .time    Assessment: Right shoulder DJD  Plan: Injections he understands the surgical options but would like to continue with the injections  Follow up as indicated.  Ice, elevate, and rest as needed.  Discussed conservative measures of pain control including ice, bracing.  Also talked about the importance of strengthening    Jimena Ho M.D.    Large Joint Arthrocentesis: L glenohumeral  Date/Time: 9/10/2021 3:04 PM  Consent given by: patient  Site marked: site marked  Timeout: Immediately prior to procedure a time out was called to verify the correct patient, procedure, equipment, support staff and site/side marked as required   Supporting Documentation  Indications: pain   Procedure Details  Location: shoulder - L glenohumeral  Preparation: Patient was prepped and draped in the usual sterile fashion  Needle size: 22 G  Approach: posterior  Medications administered: 80 mg methylPREDNISolone acetate 80 MG/ML; 4 mL lidocaine (cardiac)  Patient tolerance: patient tolerated the procedure well with no immediate complications

## 2021-10-27 ENCOUNTER — TELEPHONE (OUTPATIENT)
Dept: ORTHOPEDIC SURGERY | Facility: CLINIC | Age: 81
End: 2021-10-27

## 2021-10-27 NOTE — TELEPHONE ENCOUNTER
This patient was at gym and banged arm on a machine.  He has aggravated it.  Up until then, he was doing great.  He has a golf trip lined up for 11/7/21.  His last GH ronan inj was 8/16/21.  He is wondering if there is anything that you can do for him, he was hoping to get another injection even though it has not been 3 months. Maybe you would like to see him first?  Please advise.

## 2021-11-01 NOTE — PROGRESS NOTES
Patient: Hu Harris  YOB: 1940  Date of Service: 11/1/2021    Chief Complaints: Right shoulder pain    Subjective:    History of Present Illness: Pt is seen in the office today with complaints of right shoulder pain he has known degenerative changes he does get intermittent injections.  He states he was doing well however he was at a volleyball game recently when he kind of tripped and fell into a railing on the right shoulder.  His symptoms have worsened he did want I can go through his x-rays again on his options.          Allergies:   Allergies   Allergen Reactions   • Iodine Swelling     WITH SHELLFISH   • Penicillins Other (See Comments)     TOLD SO YEARS AGO   • Shellfish-Derived Products Other (See Comments)     SWELLING       Medications:   Home Medications:  Current Outpatient Medications on File Prior to Visit   Medication Sig   • Aspirin 81 MG capsule Take 325 mg by mouth Every Morning.   • clonazePAM (KlonoPIN) 0.5 MG tablet Take 0.5 mg by mouth At Night As Needed.   • Cyanocobalamin (B-12) 1000 MCG sublingual tablet DISSOLVE 1 TABLET UNDER THE TONGUE EVERY DAY   • levothyroxine (SYNTHROID, LEVOTHROID) 88 MCG tablet Take 88 mcg by mouth Every Morning.   • meloxicam (MOBIC) 15 MG tablet TAKE 1 TABLET BY MOUTH DAILY WITH FOOD   • naproxen sodium (ALEVE) 220 MG tablet Take 220 mg by mouth 2 (Two) Times a Day As Needed.   • pravastatin (PRAVACHOL) 20 MG tablet TK 1 T PO QAM     No current facility-administered medications on file prior to visit.     Current Medications:  Scheduled Meds:  Continuous Infusions:No current facility-administered medications for this visit.    PRN Meds:.    I have reviewed the patient's medical history in detail and updated the computerized patient record.  Review and summarization of old records include:    Past Medical History:   Diagnosis Date   • Arthritis    • Back pain    • Disease of thyroid gland    • High cholesterol    • Knee pain, right     TORN  MENISCUS        Past Surgical History:   Procedure Laterality Date   • FOOT SURGERY Bilateral    • HERNIA REPAIR     • KNEE ARTHROSCOPY Right 2017    Procedure: KNEE ARTHROSCOPY WITH PARTICAL MEDIAL MENISECTOMY AND ARTHRITIS DEBRIDMENT;  Surgeon: Jimena Ho MD;  Location: [facility] OR [facility] [place];  Service:    • KNEE SURGERY Left     partial replacement         Social History     Occupational History   • Not on file   Tobacco Use   • Smoking status: Former Smoker     Packs/day: 0.50     Years: 12.00     Pack years: 6.00     Types: Cigarettes     Quit date:      Years since quittin.8   • Smokeless tobacco: Never Used   Vaping Use   • Vaping Use: Never used   Substance and Sexual Activity   • Alcohol use: Yes     Comment: 2 DRINKS PER WEEK   • Drug use: No   • Sexual activity: Defer      Social History     Social History Narrative   • Not on file        Family History   Problem Relation Age of Onset   • Heart disease Other    • Hypertension Other    • Malig Hyperthermia Neg Hx        ROS: 14 point review of systems was performed and was negative except for documented findings in HPI and today's encounter.     Allergies:   Allergies   Allergen Reactions   • Iodine Swelling     WITH SHELLFISH   • Penicillins Other (See Comments)     TOLD SO YEARS AGO   • Shellfish-Derived Products Other (See Comments)     SWELLING     Constitutional:  Denies fever, shaking or chills   Eyes:  Denies change in visual acuity   HENT:  Denies nasal congestion or sore throat   Respiratory:  Denies cough or shortness of breath   Cardiovascular:  Denies chest pain or severe LE edema   GI:  Denies abdominal pain, nausea, vomiting, bloody stools or diarrhea   Musculoskeletal:  Numbness, tingling, or loss of motor function only as noted above in history of present illness.  : Denies painful urination or hematuria  Integument:  Denies rash, lesion or ulceration   Neurologic:  Denies headache or focal weakness  Endocrine:  Denies  lymphadenopathy  Psych:  Denies confusion or change in mental status   Hem:  Denies active bleeding      Physical Exam: 81 y.o. male  Wt Readings from Last 3 Encounters:   09/10/21 78 kg (172 lb)   08/16/21 78.5 kg (173 lb)   06/04/21 78.7 kg (173 lb 8 oz)       There is no height or weight on file to calculate BMI.  No height and weight on file for this encounter.  There were no vitals filed for this visit.  Vital signs reviewed.   General Appearance:    Alert, cooperative, in no acute distress                    Ortho exam      Physical exam the right shoulder reveals no overlying skin changes no lymphedema lymphadenopathy the patient can actively flex to about 150 passively I get them to 160 abduction is similar external rotation is 40 internal rotation to there buttock.  Rotator cuff strength is 4+ over 5 with isometric strength testing no overlying skin changes.  Patient has reasonable cervical range of motion for their age no radicular symptoms and a normal elbow exam.  There are good distal pulses.       We did review his x-rays from last visit he has significant degenerative changes with flattening the humeral head as best seen on the axillary lateral    Assessment: Right shoulder pain he states a lot of his pain is right on the tip of his acromion but that pain does resolve with injections.  We talked about injection he would like to do that today.  He would like to do some physical therapy as well.  I told him I am happy to give him prescription but I wanted him to take a picture of his x-rays so he can show the therapist.  I do not want the therapist pushing his motion and making his symptoms worse.  We also talked about shoulder replacement again about a regular versus a reverse he and I both agree that at this point if the shots are still helping we continue to do that.  Our discussion was at least 30 minutes regarding all the above and review of x-rays as well as his new injury    Plan:   Follow up as  indicated.  Ice, elevate, and rest as needed.  Discussed conservative measures of pain control including ice, bracing.  Also talked about the importance of strengthening and maintaining ideal body weight    Jimena Ho M.D.    Large Joint Arthrocentesis: R glenohumeral  Date/Time: 11/4/2021 8:51 AM  Consent given by: patient  Site marked: site marked  Timeout: Immediately prior to procedure a time out was called to verify the correct patient, procedure, equipment, support staff and site/side marked as required   Supporting Documentation  Indications: pain   Procedure Details  Location: shoulder - R glenohumeral  Preparation: Patient was prepped and draped in the usual sterile fashion  Needle size: 22 G  Approach: posterior  Medications administered: 80 mg methylPREDNISolone acetate 80 MG/ML; 4 mL lidocaine (cardiac)  Patient tolerance: patient tolerated the procedure well with no immediate complications

## 2021-11-04 ENCOUNTER — CLINICAL SUPPORT (OUTPATIENT)
Dept: ORTHOPEDIC SURGERY | Facility: CLINIC | Age: 81
End: 2021-11-04

## 2021-11-04 VITALS — WEIGHT: 172.5 LBS | TEMPERATURE: 97.2 F | BODY MASS INDEX: 24.15 KG/M2 | HEIGHT: 71 IN

## 2021-11-04 DIAGNOSIS — M19.011 PRIMARY LOCALIZED OSTEOARTHROSIS OF RIGHT SHOULDER REGION: ICD-10-CM

## 2021-11-04 DIAGNOSIS — M25.512 LEFT SHOULDER PAIN, UNSPECIFIED CHRONICITY: Primary | ICD-10-CM

## 2021-11-04 PROCEDURE — 99213 OFFICE O/P EST LOW 20 MIN: CPT | Performed by: ORTHOPAEDIC SURGERY

## 2021-11-04 PROCEDURE — 20610 DRAIN/INJ JOINT/BURSA W/O US: CPT | Performed by: ORTHOPAEDIC SURGERY

## 2021-11-04 RX ORDER — METHYLPREDNISOLONE ACETATE 80 MG/ML
80 INJECTION, SUSPENSION INTRA-ARTICULAR; INTRALESIONAL; INTRAMUSCULAR; SOFT TISSUE
Status: COMPLETED | OUTPATIENT
Start: 2021-11-04 | End: 2021-11-04

## 2021-11-04 RX ADMIN — METHYLPREDNISOLONE ACETATE 80 MG: 80 INJECTION, SUSPENSION INTRA-ARTICULAR; INTRALESIONAL; INTRAMUSCULAR; SOFT TISSUE at 08:51

## 2021-12-03 ENCOUNTER — TELEPHONE (OUTPATIENT)
Dept: ORTHOPEDIC SURGERY | Facility: CLINIC | Age: 81
End: 2021-12-03

## 2021-12-03 NOTE — TELEPHONE ENCOUNTER
Provider: DR. SNYDER  Caller: KASSIE CHINCHILLA  Relationship to Patient: SELF     Phone Number: [phone]  Reason for Call: RT SHOULDER SX    When was the patient last seen: 11-4-21    PATIENT WOULD LIKE A CALL BACK TO DISCUSS SCHEDULING APPT WITH DR. WILSON TO BE EVALUATED FOR RT SHOULDER SX.

## 2021-12-08 ENCOUNTER — OFFICE VISIT (OUTPATIENT)
Dept: ORTHOPEDIC SURGERY | Facility: CLINIC | Age: 81
End: 2021-12-08

## 2021-12-08 VITALS — WEIGHT: 173.5 LBS | TEMPERATURE: 97.7 F | BODY MASS INDEX: 24.29 KG/M2 | HEIGHT: 71 IN

## 2021-12-08 DIAGNOSIS — M19.019 ARTHRITIS OF SHOULDER: Primary | ICD-10-CM

## 2021-12-08 PROCEDURE — 99213 OFFICE O/P EST LOW 20 MIN: CPT | Performed by: ORTHOPAEDIC SURGERY

## 2021-12-08 RX ORDER — CEFAZOLIN SODIUM 2 G/100ML
2 INJECTION, SOLUTION INTRAVENOUS ONCE
Status: CANCELLED | OUTPATIENT
Start: 2022-01-13 | End: 2021-12-08

## 2021-12-08 RX ORDER — TRAZODONE HYDROCHLORIDE 100 MG/1
TABLET ORAL
COMMUNITY
Start: 2021-12-06 | End: 2021-12-30

## 2021-12-08 RX ORDER — ATORVASTATIN CALCIUM 40 MG/1
TABLET, FILM COATED ORAL
COMMUNITY
End: 2021-12-08 | Stop reason: SDUPTHER

## 2021-12-08 NOTE — PROGRESS NOTES
Patient: Hu Harris    YOB: 1940    Medical Record Number: [id number]    Chief Complaints:  Right shoulder pain    History of Present Illness:     81 y.o. male patient who presents with a complaint of right shoulder pain.  He reports that the symptoms first started years ago.  This has been a gradually progressively worsening issue for him.  He has previously been treated by Dr. Ho.  He has had multiple injections over the years.  The injections did not seem to work nearly as well at this point.  He reports moderate constant aching pain.  He reports grinding in the shoulder.  He reports some loss of motion but he does say that his overall function is quite good and he is pretty much able to do everything that he wants to do on a daily basis.    Allergies:   Allergies   Allergen Reactions   • Iodine Swelling     WITH SHELLFISH   • Penicillins Other (See Comments)     TOLD SO YEARS AGO   • Shellfish-Derived Products Other (See Comments)     SWELLING       Home Medications:    Current Outpatient Medications:   •  Aspirin 81 MG capsule, Take 325 mg by mouth Every Morning., Disp: , Rfl:   •  clonazePAM (KlonoPIN) 0.5 MG tablet, Take 0.5 mg by mouth At Night As Needed., Disp: , Rfl: 1  •  Cyanocobalamin (B-12) 1000 MCG sublingual tablet, DISSOLVE 1 TABLET UNDER THE TONGUE EVERY DAY, Disp: , Rfl:   •  levothyroxine (SYNTHROID, LEVOTHROID) 88 MCG tablet, Take 88 mcg by mouth Every Morning., Disp: , Rfl: 2  •  meloxicam (MOBIC) 15 MG tablet, TAKE 1 TABLET BY MOUTH DAILY WITH FOOD, Disp: 30 tablet, Rfl: 0  •  naproxen sodium (ALEVE) 220 MG tablet, Take 220 mg by mouth 2 (Two) Times a Day As Needed., Disp: , Rfl:   •  pravastatin (PRAVACHOL) 20 MG tablet, TK 1 T PO QAM, Disp: , Rfl: 3  •  traZODone (DESYREL) 100 MG tablet, , Disp: , Rfl:     Past Medical History:   Diagnosis Date   • Arthritis    • Back pain    • Disease of thyroid gland    • High cholesterol    • Knee pain, right     TORN MENISCUS        Past Surgical History:   Procedure Laterality Date   • FOOT SURGERY Bilateral    • HERNIA REPAIR     • KNEE ARTHROSCOPY Right 2017    Procedure: KNEE ARTHROSCOPY WITH PARTICAL MEDIAL MENISECTOMY AND ARTHRITIS DEBRIDMENT;  Surgeon: Jimena Ho MD;  [place] [facility] [place] [facility] [place]:    • KNEE SURGERY Left     partial replacement        Social History     Occupational History   • Not on file   Tobacco Use   • Smoking status: Former Smoker     Packs/day: 0.50     Years: 12.00     Pack years: 6.00     Types: Cigarettes     Quit date:      Years since quittin.9   • Smokeless tobacco: Never Used   Vaping Use   • Vaping Use: Never used   Substance and Sexual Activity   • Alcohol use: Yes     Comment: 2 DRINKS PER WEEK   • Drug use: No   • Sexual activity: Defer      Social History     Social History Narrative   • Not on file       Family History   Problem Relation Age of Onset   • Heart disease Other    • Hypertension Other    • Malig Hyperthermia Neg Hx        Review of Systems:      Constitutional: Denies fever, shaking or chills   Eyes: Denies change in visual acuity   HEENT: Denies nasal congestion or sore throat   Respiratory: Denies cough or shortness of breath   Cardiovascular: Denies chest pain or edema  Endocrine: Denies tremors, palpitations, intolerance of heat or cold, polyuria, polydipsia.  GI: Denies abdominal pain, nausea, vomiting, bloody stools or diarrhea  : Denies frequency, urgency, incontinence, retention, or nocturia.  Musculoskeletal: Denies numbness, tingling or loss of motor function   Integument: Denies rash, lesion or ulceration   Neurologic: Denies headache or focal weakness, deficits  Heme: Denies spontaneous or excessive bleeding, epistaxis, hematuria, melena, fatigue, enlarged or tender lymph nodes.      All other pertinent positives and negatives as noted above in HPI.    Physical Exam:   81 y.o. male  Vitals:    21 0923   Temp: 97.7 °F (36.5 °C)  "  Weight: 78.7 kg (173 lb 8 oz)   Height: 180.3 cm (71\")     General:  Patient is awake and alert.  Appears in no acute distress or discomfort.    Psych:  Affect and demeanor are appropriate    Cardiovascular:  Regular rate and rhythm.    Lungs:  Good chest expansion.  Breathing unlabored.    Extremities:  Right shoulder is examined.  Skin is benign.  No obvious gross abnormalities.  No palpable masses or adenopathy.  Moderate tenderness noted over anterior glenohumeral joint and rotator interval.  Motion is to 145 of forward elevation, 20 external rotation, lacks 10 degrees of horizontal abduction, back pocket internal rotation.  No instability.  Rotator cuff strength is excellent although he does have discomfort.  Good motor and sensory function in his lower arm and hand.  Palpable radial pulse.  Brisk capillary refill.         Radiology:  His previous AP, scapular Y, and axillary views of the right shoulder are reviewed to evaluate the patient's complaint.  No comparison films are immediately available.  The x-rays show severe glenohumeral osteoarthritis with bone on bone, osteophyte formation, and subchondral sclerosis.  The acromiohumeral interval measures normal.    Assessment/Plan:  Right shoulder osteoarthritis    We discussed his options.  He still has pretty good motion and overall good function.  While he is certainly a candidate for an arthroplasty, I explained that we can continue conservative treatment as long as he is doing well.  He says that it is really the pain that limits him the most.  He is an avid golfer and he wants to make sure that he is still able to golf into his late 80s.  He is concerned that his shoulder dysfunction will get worse and potentially limit him.  We had a long discussion about an arthroplasty.  He had read up on anatomic versus reverse arthroplasty and so had a lot of questions about that.  These were answered in detail.  His cuff seems to be intact as best I can tell on " exam and it appears he is a candidate for an anatomic although the reverse is a reasonable option for him as well.  Both were discussed.  He is leaning towards pursuing the anatomic at this time.  As per his request, I will have my  contact him.  I will have him return to the office for a preoperative visit before surgery.    Shahzad Saucedo MD    12/08/2021    CC to Juan Manuel Man MD

## 2021-12-10 NOTE — TELEPHONE ENCOUNTER
Caller: Hu Harris    Relationship to patient: Self    Best call back number: [phone]    Patient is needing: PATIENT NEEDS TO SPEAK TO SURGERY SCHEDULER FOR RIGHT SHOULDER FOR DR. WILSON.

## 2021-12-13 ENCOUNTER — TELEPHONE (OUTPATIENT)
Dept: ORTHOPEDIC SURGERY | Facility: CLINIC | Age: 81
End: 2021-12-13

## 2021-12-13 NOTE — TELEPHONE ENCOUNTER
This patient last saw Dr. Saucedo and they talked about getting him scheduled for a shoulder replacement does he want to talk to Dr. Saucedo?

## 2021-12-13 NOTE — TELEPHONE ENCOUNTER
Provider: CLAUDIO  Caller: KASSIE  Phone Number: [phone]  Reason for Call: REGARDING HIS SHOULDER     PER HUB WORK FLOW ATTEMPTED TO WARM TRANSFER

## 2021-12-14 NOTE — TELEPHONE ENCOUNTER
Pt is scheduled with you for a total shoulder 3/3/22. Called regarding shoulders. It was initially routed to Dr. Ho, however she pointed out this is a pt you see. Thank you.

## 2021-12-15 ENCOUNTER — TELEPHONE (OUTPATIENT)
Dept: ORTHOPEDIC SURGERY | Facility: CLINIC | Age: 81
End: 2021-12-15

## 2021-12-30 ENCOUNTER — PRE-ADMISSION TESTING (OUTPATIENT)
Dept: PREADMISSION TESTING | Facility: HOSPITAL | Age: 81
End: 2021-12-30

## 2021-12-30 ENCOUNTER — TELEPHONE (OUTPATIENT)
Dept: ORTHOPEDIC SURGERY | Facility: HOSPITAL | Age: 81
End: 2021-12-30

## 2021-12-30 VITALS
TEMPERATURE: 97.7 F | WEIGHT: 171 LBS | OXYGEN SATURATION: 98 % | SYSTOLIC BLOOD PRESSURE: 154 MMHG | HEIGHT: 72 IN | BODY MASS INDEX: 23.16 KG/M2 | DIASTOLIC BLOOD PRESSURE: 89 MMHG | RESPIRATION RATE: 18 BRPM | HEART RATE: 81 BPM

## 2021-12-30 DIAGNOSIS — M19.019 ARTHRITIS OF SHOULDER: ICD-10-CM

## 2021-12-30 LAB
ANION GAP SERPL CALCULATED.3IONS-SCNC: 7.8 MMOL/L (ref 5–15)
BUN SERPL-MCNC: 21 MG/DL (ref 8–23)
BUN/CREAT SERPL: 22.6 (ref 7–25)
CALCIUM SPEC-SCNC: 9 MG/DL (ref 8.6–10.5)
CHLORIDE SERPL-SCNC: 104 MMOL/L (ref 98–107)
CO2 SERPL-SCNC: 27.2 MMOL/L (ref 22–29)
CREAT SERPL-MCNC: 0.93 MG/DL (ref 0.76–1.27)
DEPRECATED RDW RBC AUTO: 42.2 FL (ref 37–54)
ERYTHROCYTE [DISTWIDTH] IN BLOOD BY AUTOMATED COUNT: 13 % (ref 12.3–15.4)
GFR SERPL CREATININE-BSD FRML MDRD: 78 ML/MIN/1.73
GLUCOSE SERPL-MCNC: 100 MG/DL (ref 65–99)
HCT VFR BLD AUTO: 45.4 % (ref 37.5–51)
HGB BLD-MCNC: 14.6 G/DL (ref 13–17.7)
MCH RBC QN AUTO: 29 PG (ref 26.6–33)
MCHC RBC AUTO-ENTMCNC: 32.2 G/DL (ref 31.5–35.7)
MCV RBC AUTO: 90.1 FL (ref 79–97)
PLATELET # BLD AUTO: 232 10*3/MM3 (ref 140–450)
PMV BLD AUTO: 10.2 FL (ref 6–12)
POTASSIUM SERPL-SCNC: 4.7 MMOL/L (ref 3.5–5.2)
QT INTERVAL: 388 MS
RBC # BLD AUTO: 5.04 10*6/MM3 (ref 4.14–5.8)
SODIUM SERPL-SCNC: 139 MMOL/L (ref 136–145)
WBC NRBC COR # BLD: 6.27 10*3/MM3 (ref 3.4–10.8)

## 2021-12-30 PROCEDURE — 93005 ELECTROCARDIOGRAM TRACING: CPT

## 2021-12-30 PROCEDURE — 85027 COMPLETE CBC AUTOMATED: CPT

## 2021-12-30 PROCEDURE — 80048 BASIC METABOLIC PNL TOTAL CA: CPT

## 2021-12-30 PROCEDURE — 36415 COLL VENOUS BLD VENIPUNCTURE: CPT

## 2021-12-30 PROCEDURE — 93010 ELECTROCARDIOGRAM REPORT: CPT | Performed by: INTERNAL MEDICINE

## 2021-12-30 RX ORDER — CHLORAL HYDRATE 500 MG
1000 CAPSULE ORAL
COMMUNITY

## 2021-12-30 RX ORDER — MULTIPLE VITAMINS W/ MINERALS TAB 9MG-400MCG
1 TAB ORAL DAILY
COMMUNITY

## 2021-12-30 RX ORDER — ACETAMINOPHEN 500 MG
1000 TABLET ORAL NIGHTLY
COMMUNITY
End: 2022-01-13 | Stop reason: HOSPADM

## 2021-12-30 RX ORDER — ZINC GLUCONATE 50 MG
1 TABLET ORAL DAILY
COMMUNITY

## 2021-12-30 RX ORDER — OMEPRAZOLE 20 MG/1
20 CAPSULE, DELAYED RELEASE ORAL DAILY PRN
COMMUNITY

## 2021-12-30 NOTE — TELEPHONE ENCOUNTER
"Spoke to patient regarding the importance of watching total joint video prior to surgery. Patient states,\" I don't feel I need to watch anything, I'm fine\".   "

## 2022-01-01 NOTE — ANESTHESIA PROCEDURE NOTES
Airway  Urgency: elective    Airway not difficult    General Information and Staff    Patient location during procedure: OR  Anesthesiologist: VIKTORIA PATEL  CRNA: ERICA MCMAHAN    Indications and Patient Condition  Indications for airway management: airway protection    Preoxygenated: yes  MILS not maintained throughout  Mask difficulty assessment: 1 - vent by mask    Final Airway Details  Final airway type: supraglottic airway      Successful airway: classic  Size 5    Number of attempts at approach: 1    Additional Comments  Lma insertion appears atraumatic. Dentition intact.             ,DirectAddress_Unknown

## 2022-01-05 ENCOUNTER — OFFICE VISIT (OUTPATIENT)
Dept: ORTHOPEDIC SURGERY | Facility: CLINIC | Age: 82
End: 2022-01-05

## 2022-01-05 VITALS — HEIGHT: 72 IN | WEIGHT: 173 LBS | BODY MASS INDEX: 23.43 KG/M2 | TEMPERATURE: 97.5 F

## 2022-01-05 DIAGNOSIS — Z01.818 PREOP EXAMINATION: ICD-10-CM

## 2022-01-05 DIAGNOSIS — M25.561 RIGHT KNEE PAIN, UNSPECIFIED CHRONICITY: Primary | ICD-10-CM

## 2022-01-05 PROCEDURE — S0260 H&P FOR SURGERY: HCPCS | Performed by: NURSE PRACTITIONER

## 2022-01-05 NOTE — PROGRESS NOTES
History & Physical       Patient: Hu Harris    YOB: 1940    Medical Record Number: [id number]    Chief Complaints:   Chief Complaint   Patient presents with   • Right Shoulder - Pre-op Exam       History of Present Illness: 81 y.o. male who comes in today in anticipation of upcoming total shoulder replacement surgery. Reports a long history of progressively worsening pain, motion loss, and dysfunction.  Describes current pain as severe.  Has failed prolonged conservative treatment.  Denies any new complaints or issues.    Allergies:   Allergies   Allergen Reactions   • Iodine Swelling     WITH SHELLFISH   • Penicillins Other (See Comments)     TOLD SO YEARS AGO   • Shellfish-Derived Products Other (See Comments)     SWELLING       Medications:   Home Medications:    Current Outpatient Medications:   •  acetaminophen (TYLENOL) 500 MG tablet, Take 1,000 mg by mouth Every Night., Disp: , Rfl:   •  Aspirin 81 MG capsule, Take 81 mg by mouth Every Morning. HOLD PRIOR TO SURG, Disp: , Rfl:   •  CHLORHEXIDINE GLUCONATE CLOTH EX, Apply  topically. AS DIRECTED PREOP, Disp: , Rfl:   •  clonazePAM (KlonoPIN) 0.5 MG tablet, Take 0.25 mg by mouth At Night As Needed., Disp: , Rfl: 1  •  levothyroxine (SYNTHROID, LEVOTHROID) 88 MCG tablet, Take 88 mcg by mouth Every Morning., Disp: , Rfl: 2  •  meloxicam (MOBIC) 15 MG tablet, TAKE 1 TABLET BY MOUTH DAILY WITH FOOD (Patient taking differently: TAKE 1 TABLET BY MOUTH DAILY WITH FOOD/INSTRUCTED PT TO FOLLOW MD INSTRUCTIONS REGARDING HOLDING FOR SURGERY), Disp: 30 tablet, Rfl: 0  •  multivitamin with minerals (MULTIVITAMIN ADULT PO), Take 1 tablet by mouth Daily. HOLD PRIOR TO SURG, Disp: , Rfl:   •  mupirocin (BACTROBAN) 2 % nasal ointment, into the nostril(s) as directed by provider. AS DIRECTED PREOP, Disp: , Rfl:   •  Omega-3 Fatty Acids (fish oil) 1000 MG capsule capsule, Take 1,000 mg by mouth Daily With Breakfast. HOLD PRIOR TO SURG, Disp: , Rfl:   •   omeprazole (priLOSEC) 20 MG capsule, Take 20 mg by mouth Daily As Needed., Disp: , Rfl:   •  pravastatin (PRAVACHOL) 20 MG tablet, Take 20 mg by mouth Every Night., Disp: , Rfl: 3  •  Zinc 50 MG tablet, Take 1 tablet by mouth Daily. HOLD PRIOR TO SURG, Disp: , Rfl:   No current facility-administered medications for this visit.    Facility-Administered Medications Ordered in Other Visits:   •  benzoyl peroxide 5 % external liquid, , Topical, Nightly, Shahzad Saucedo MD    Past Medical History:   Diagnosis Date   • Anxiety    • Arthritis    • Back pain    • Disease of thyroid gland    • High cholesterol    • Right shoulder pain           Past Surgical History:   Procedure Laterality Date   • FOOT SURGERY Bilateral    • HERNIA REPAIR     • KNEE ARTHROSCOPY Right 2017    Procedure: KNEE ARTHROSCOPY WITH PARTICAL MEDIAL MENISECTOMY AND ARTHRITIS DEBRIDMENT;  Surgeon: Jimena Ho MD;  Location: [facility] [place];  Service:    • KNEE SURGERY Left     partial replacement           Social History     Occupational History   • Not on file   Tobacco Use   • Smoking status: Former Smoker     Packs/day: 0.50     Years: 12.00     Pack years: 6.00     Types: Cigarettes     Quit date:      Years since quittin.0   • Smokeless tobacco: Never Used   Vaping Use   • Vaping Use: Never used   Substance and Sexual Activity   • Alcohol use: Yes     Comment: 3 DRINKS WEEK   • Drug use: No   • Sexual activity: Defer      Social History     Social History Narrative   • Not on file          Family History   Problem Relation Age of Onset   • Heart disease Other    • Hypertension Other    • Malig Hyperthermia Neg Hx        Review of Systems:     Constitutional:  Denies fever, shaking or chills   Eyes:  Denies change in visual acuity   HEENT:  Denies nasal congestion or sore throat   Respiratory:  Denies cough or shortness of breath   Cardiovascular:  Denies chest pain or edema   GI:  Denies abdominal pain, nausea,  "vomiting, bloody stools or diarrhea   Musculoskeletal:  Denies numbness tingling or loss of motor function except as outlined above in history of present illness.  Integument:  Denies rash, lesion or ulceration   Neurologic:  Denies headache or focal weakness, deficits      All other pertinent positives and negatives as noted above in HPI.    Physical Exam: 81 y.o. male    Vitals:    01/05/22 0924   Temp: 97.5 °F (36.4 °C)   Weight: 78.5 kg (173 lb)   Height: 182.9 cm (72\")     General:  Patient is awake and alert.  Appears in no acute distress or discomfort.    Psych:  Affect and demeanor are appropriate.    Eyes:  Conjunctiva and sclera appear grossly normal.  Eyes track well and EOM seem to be intact.    Ears:  No gross abnormalities.  Hearing adequate for the exam.    Cardiovascular:  Regular rate and rhythm.    Lungs:  Good chest expansion.  Breathing unlabored.    Lymph:  No palpable adenopathy about neck or axilla.    Neck:  Supple.  Normal ROM.  Negative Spurling's for shoulder or arm pain.    Right upper extremity:  Skin benign and intact without evidence for swelling, masses or atrophy.  No palpable masses.  Moderate tenderness over the anterior glenohumeral joint and rotator interval.  Shoulder ROM limited and uncomfortable--140° FE, 20° ER, IR to back pocket.  No evident instability.  Good strength in rotator cuff, deltoid, wrist and hand.  Intact sensation in arm, hand.  Palpable radial pulse with brisk cap refill.    Diagnostic Tests:  Lab Results   Component Value Date    GLUCOSE 100 (H) 12/30/2021    CALCIUM 9.0 12/30/2021     12/30/2021    K 4.7 12/30/2021    CO2 27.2 12/30/2021     12/30/2021    BUN 21 12/30/2021    CREATININE 0.93 12/30/2021    EGFRIFNONA 78 12/30/2021    BCR 22.6 12/30/2021    ANIONGAP 7.8 12/30/2021     Lab Results   Component Value Date    WBC 6.27 12/30/2021    HGB 14.6 12/30/2021    HCT 45.4 12/30/2021    MCV 90.1 12/30/2021     12/30/2021     No results " found for: INR, PROTIME    Imaging:   Previous x-rays of the shoulder are reviewed.  The x-rays show severe glenohumeral osteoarthritis with bone-on-bone, osteophyte formation, and subchondral sclerosis.  The acromiohumeral interval measures normal.    Assessment:  Right shoulder osteoarthritisarthritis    Plan: We had a thorough discussion regarding the risks, benefits and alternatives to an arthroplasty and non-surgical management versus surgery.  I explained that surgical risks include infection, hematoma, hardware related complications including failure of fixation, loosening, fracture, subscapularis repair failure and/or rotator cuff tear necessitating revision surgery, persistent pain and/or loss of motion, iatrogenic nerve and/or blood vessel injury resulting in permanent weakness, numbness or dysfunction, DVT, PE, positioning related neuropraxia, and anesthesia related complications resulting in death.  I did explain the possible need for reverse arthroplasty depending upon the degree of retroversion and the status of the rotator cuff at the time of surgery.  I further explained the risks inherent to reverse arthroplasty as compared to a standard total shoulder.  Specifically, we discussed the risks of notching, glenoid loosening, instability, and traction related neuropraxia, any of which could result in persistent pain or problems requiring further surgery.   Lastly, we discussed the rehab and all that will be expected of the patient post operatively to ensure an optimal outcome.  The patient has acknowledged understanding and consents to proceed.    Patient reports history of post-op urinary retention.  He is interested getting a prescription of Flomax as this has helped tremendously in the past.  I have spoken with Dr. Saucedo regarding his request and he is in agreement.  I have given him a prescription for Flomax to begin 2 days prior to surgery and continue for 1 week post-op.  The risks were  discussed.      Lia Baeza, APRN    Date: 1/5/2022

## 2022-01-05 NOTE — H&P (VIEW-ONLY)
History & Physical       Patient: Hu Harris    YOB: 1940    Medical Record Number: [id number]    Chief Complaints:   Chief Complaint   Patient presents with   • Right Shoulder - Pre-op Exam       History of Present Illness: 81 y.o. male who comes in today in anticipation of upcoming total shoulder replacement surgery. Reports a long history of progressively worsening pain, motion loss, and dysfunction.  Describes current pain as severe.  Has failed prolonged conservative treatment.  Denies any new complaints or issues.    Allergies:   Allergies   Allergen Reactions   • Iodine Swelling     WITH SHELLFISH   • Penicillins Other (See Comments)     TOLD SO YEARS AGO   • Shellfish-Derived Products Other (See Comments)     SWELLING       Medications:   Home Medications:    Current Outpatient Medications:   •  acetaminophen (TYLENOL) 500 MG tablet, Take 1,000 mg by mouth Every Night., Disp: , Rfl:   •  Aspirin 81 MG capsule, Take 81 mg by mouth Every Morning. HOLD PRIOR TO SURG, Disp: , Rfl:   •  CHLORHEXIDINE GLUCONATE CLOTH EX, Apply  topically. AS DIRECTED PREOP, Disp: , Rfl:   •  clonazePAM (KlonoPIN) 0.5 MG tablet, Take 0.25 mg by mouth At Night As Needed., Disp: , Rfl: 1  •  levothyroxine (SYNTHROID, LEVOTHROID) 88 MCG tablet, Take 88 mcg by mouth Every Morning., Disp: , Rfl: 2  •  meloxicam (MOBIC) 15 MG tablet, TAKE 1 TABLET BY MOUTH DAILY WITH FOOD (Patient taking differently: TAKE 1 TABLET BY MOUTH DAILY WITH FOOD/INSTRUCTED PT TO FOLLOW MD INSTRUCTIONS REGARDING HOLDING FOR SURGERY), Disp: 30 tablet, Rfl: 0  •  multivitamin with minerals (MULTIVITAMIN ADULT PO), Take 1 tablet by mouth Daily. HOLD PRIOR TO SURG, Disp: , Rfl:   •  mupirocin (BACTROBAN) 2 % nasal ointment, into the nostril(s) as directed by provider. AS DIRECTED PREOP, Disp: , Rfl:   •  Omega-3 Fatty Acids (fish oil) 1000 MG capsule capsule, Take 1,000 mg by mouth Daily With Breakfast. HOLD PRIOR TO SURG, Disp: , Rfl:   •   omeprazole (priLOSEC) 20 MG capsule, Take 20 mg by mouth Daily As Needed., Disp: , Rfl:   •  pravastatin (PRAVACHOL) 20 MG tablet, Take 20 mg by mouth Every Night., Disp: , Rfl: 3  •  Zinc 50 MG tablet, Take 1 tablet by mouth Daily. HOLD PRIOR TO SURG, Disp: , Rfl:   No current facility-administered medications for this visit.    Facility-Administered Medications Ordered in Other Visits:   •  benzoyl peroxide 5 % external liquid, , Topical, Nightly, Shahzad Saucedo MD    Past Medical History:   Diagnosis Date   • Anxiety    • Arthritis    • Back pain    • Disease of thyroid gland    • High cholesterol    • Right shoulder pain           Past Surgical History:   Procedure Laterality Date   • FOOT SURGERY Bilateral    • HERNIA REPAIR     • KNEE ARTHROSCOPY Right 2017    Procedure: KNEE ARTHROSCOPY WITH PARTICAL MEDIAL MENISECTOMY AND ARTHRITIS DEBRIDMENT;  Surgeon: Jimena Ho MD;  [place] [facility] [place] [facility] [place]  Service:    • KNEE SURGERY Left     partial replacement           Social History     Occupational History   • Not on file   Tobacco Use   • Smoking status: Former Smoker     Packs/day: 0.50     Years: 12.00     Pack years: 6.00     Types: Cigarettes     Quit date:      Years since quittin.0   • Smokeless tobacco: Never Used   Vaping Use   • Vaping Use: Never used   Substance and Sexual Activity   • Alcohol use: Yes     Comment: 3 DRINKS WEEK   • Drug use: No   • Sexual activity: Defer      Social History     Social History Narrative   • Not on file          Family History   Problem Relation Age of Onset   • Heart disease Other    • Hypertension Other    • Malig Hyperthermia Neg Hx        Review of Systems:     Constitutional:  Denies fever, shaking or chills   Eyes:  Denies change in visual acuity   HEENT:  Denies nasal congestion or sore throat   Respiratory:  Denies cough or shortness of breath   Cardiovascular:  Denies chest pain or edema   GI:  Denies abdominal pain, nausea,  "vomiting, bloody stools or diarrhea   Musculoskeletal:  Denies numbness tingling or loss of motor function except as outlined above in history of present illness.  Integument:  Denies rash, lesion or ulceration   Neurologic:  Denies headache or focal weakness, deficits      All other pertinent positives and negatives as noted above in HPI.    Physical Exam: 81 y.o. male    Vitals:    01/05/22 0924   Temp: 97.5 °F (36.4 °C)   Weight: 78.5 kg (173 lb)   Height: 182.9 cm (72\")     General:  Patient is awake and alert.  Appears in no acute distress or discomfort.    Psych:  Affect and demeanor are appropriate.    Eyes:  Conjunctiva and sclera appear grossly normal.  Eyes track well and EOM seem to be intact.    Ears:  No gross abnormalities.  Hearing adequate for the exam.    Cardiovascular:  Regular rate and rhythm.    Lungs:  Good chest expansion.  Breathing unlabored.    Lymph:  No palpable adenopathy about neck or axilla.    Neck:  Supple.  Normal ROM.  Negative Spurling's for shoulder or arm pain.    Right upper extremity:  Skin benign and intact without evidence for swelling, masses or atrophy.  No palpable masses.  Moderate tenderness over the anterior glenohumeral joint and rotator interval.  Shoulder ROM limited and uncomfortable--140° FE, 20° ER, IR to back pocket.  No evident instability.  Good strength in rotator cuff, deltoid, wrist and hand.  Intact sensation in arm, hand.  Palpable radial pulse with brisk cap refill.    Diagnostic Tests:  Lab Results   Component Value Date    GLUCOSE 100 (H) 12/30/2021    CALCIUM 9.0 12/30/2021     12/30/2021    K 4.7 12/30/2021    CO2 27.2 12/30/2021     12/30/2021    BUN 21 12/30/2021    CREATININE 0.93 12/30/2021    EGFRIFNONA 78 12/30/2021    BCR 22.6 12/30/2021    ANIONGAP 7.8 12/30/2021     Lab Results   Component Value Date    WBC 6.27 12/30/2021    HGB 14.6 12/30/2021    HCT 45.4 12/30/2021    MCV 90.1 12/30/2021     12/30/2021     No results " found for: INR, PROTIME    Imaging:   Previous x-rays of the shoulder are reviewed.  The x-rays show severe glenohumeral osteoarthritis with bone-on-bone, osteophyte formation, and subchondral sclerosis.  The acromiohumeral interval measures normal.    Assessment:  Right shoulder osteoarthritisarthritis    Plan: We had a thorough discussion regarding the risks, benefits and alternatives to an arthroplasty and non-surgical management versus surgery.  I explained that surgical risks include infection, hematoma, hardware related complications including failure of fixation, loosening, fracture, subscapularis repair failure and/or rotator cuff tear necessitating revision surgery, persistent pain and/or loss of motion, iatrogenic nerve and/or blood vessel injury resulting in permanent weakness, numbness or dysfunction, DVT, PE, positioning related neuropraxia, and anesthesia related complications resulting in death.  I did explain the possible need for reverse arthroplasty depending upon the degree of retroversion and the status of the rotator cuff at the time of surgery.  I further explained the risks inherent to reverse arthroplasty as compared to a standard total shoulder.  Specifically, we discussed the risks of notching, glenoid loosening, instability, and traction related neuropraxia, any of which could result in persistent pain or problems requiring further surgery.   Lastly, we discussed the rehab and all that will be expected of the patient post operatively to ensure an optimal outcome.  The patient has acknowledged understanding and consents to proceed.    Patient reports history of post-op urinary retention.  He is interested getting a prescription of Flomax as this has helped tremendously in the past.  I have spoken with Dr. Saucedo regarding his request and he is in agreement.  I have given him a prescription for Flomax to begin 2 days prior to surgery and continue for 1 week post-op.  The risks were  discussed.      Lia Baeza, APRN    Date: 1/5/2022

## 2022-01-06 RX ORDER — TAMSULOSIN HYDROCHLORIDE 0.4 MG/1
CAPSULE ORAL
Qty: 10 CAPSULE | Refills: 0 | Status: SHIPPED | OUTPATIENT
Start: 2022-01-06

## 2022-01-10 ENCOUNTER — TELEPHONE (OUTPATIENT)
Dept: ORTHOPEDIC SURGERY | Facility: HOSPITAL | Age: 82
End: 2022-01-10

## 2022-01-10 NOTE — TELEPHONE ENCOUNTER
Risk Factor yes no   Age >75 X    BMI <20 >40  X   Patient History     Chronic Pain (2 or more meds/Pain Management)  X   ETOH (more than 3 drinks Daily)  X   Uncontrolled Depression/Bipolar/Schizoaffective Disorder  X   Arrhythmias  X   Stent placement/MI  X   DVT/PE  X   Pacemaker  X   HTN (uncontrolled or requiring more than 2 medications)  X   CHF/Retained fluids/Edema  X   Stroke with Residual   X   COPD/Asthma  X   HARINI--Non-compliant with CPAP  X   Diabetes (on insulin or more than 2 meds)         A1C: 6.4  X   BPH/Urinary retention (on medication)  X   CKD  X   Home environment and support     Current ambulation status (use of cane, walker, W/C, Multiple falls/weakness)  X   Stairs to enter and throughout home X    Lives Alone  X   Doesn’t have support at home  X     Outpatient Screening Assessment    Home needs: (Walker/BSC):  Total Shoulder  ? Steps 7 steps; walkway (feet between stairs)  Caregiver 24-48hrs post-discharge: Home with Son     Discharge Plan:   Total Shoulder    Prescriptions: Meds to bed    Home medications:   ? Blood thinner/anti-coag therapy--ASA  ? BPH or diuretic--Flomax  ? BP meds  ? Pain/Anti-inflammatories--Mobic  Pre-op Education:  Educate patient on spinal anesthesia/pain control:  ? patient verbalize understanding    Educate patient on hospital course/timeline:  ?  patient verbalize understanding    Joint Care Class:  ?  yes ? no      Notes:     Knee injection tomorrow (1/11/2022)  Has a H/O Retention after surgery, was started prophylactically on Flomax

## 2022-01-11 ENCOUNTER — LAB (OUTPATIENT)
Dept: LAB | Facility: HOSPITAL | Age: 82
End: 2022-01-11

## 2022-01-11 ENCOUNTER — CLINICAL SUPPORT (OUTPATIENT)
Dept: ORTHOPEDIC SURGERY | Facility: CLINIC | Age: 82
End: 2022-01-11

## 2022-01-11 VITALS — HEIGHT: 72 IN | TEMPERATURE: 96.9 F | WEIGHT: 173 LBS | BODY MASS INDEX: 23.43 KG/M2

## 2022-01-11 DIAGNOSIS — M25.561 RIGHT KNEE PAIN, UNSPECIFIED CHRONICITY: ICD-10-CM

## 2022-01-11 DIAGNOSIS — M19.019 ARTHRITIS OF SHOULDER: ICD-10-CM

## 2022-01-11 DIAGNOSIS — M17.11 OSTEOARTHRITIS OF RIGHT KNEE, UNSPECIFIED OSTEOARTHRITIS TYPE: Primary | ICD-10-CM

## 2022-01-11 LAB — SARS-COV-2 ORF1AB RESP QL NAA+PROBE: NOT DETECTED

## 2022-01-11 PROCEDURE — C9803 HOPD COVID-19 SPEC COLLECT: HCPCS

## 2022-01-11 PROCEDURE — U0004 COV-19 TEST NON-CDC HGH THRU: HCPCS

## 2022-01-11 PROCEDURE — 20610 DRAIN/INJ JOINT/BURSA W/O US: CPT | Performed by: NURSE PRACTITIONER

## 2022-01-11 PROCEDURE — 73562 X-RAY EXAM OF KNEE 3: CPT | Performed by: NURSE PRACTITIONER

## 2022-01-11 NOTE — PROGRESS NOTES
[facility]  Follow Up Visit      Patient Name: Hu Harris  : 1940  Primary Care Physician: Juan Manuel Man MD        Chief Complaint: R knee pain     HPI:   Hu Harris is a 81 y.o. year old who presents today for R knee pain. Patient has his history of OA in the R knee, has received intermittent injections of cortisone and wanted to give visco a try. PA was obtained. He has symptoms in the R knee which are worse with activity. Has a uni on the left knee, medial compartment. He is due to have shoulder replacement with Dr. Saucedo this week and wanted something to ease his symptoms prior to that.     ROS:  ROS negative except as listed in the HPI.    Allergies:   Allergies   Allergen Reactions   • Iodine Swelling     WITH SHELLFISH   • Penicillins Other (See Comments)     TOLD SO YEARS AGO   • Shellfish-Derived Products Other (See Comments)     SWELLING       Medications:  Current Outpatient Medications on File Prior to Visit   Medication Sig   • acetaminophen (TYLENOL) 500 MG tablet Take 1,000 mg by mouth Every Night.   • Aspirin 81 MG capsule Take 81 mg by mouth Every Morning. HOLD PRIOR TO SURG   • CHLORHEXIDINE GLUCONATE CLOTH EX Apply  topically. AS DIRECTED PREOP   • clonazePAM (KlonoPIN) 0.5 MG tablet Take 0.25 mg by mouth At Night As Needed.   • levothyroxine (SYNTHROID, LEVOTHROID) 88 MCG tablet Take 88 mcg by mouth Every Morning.   • meloxicam (MOBIC) 15 MG tablet TAKE 1 TABLET BY MOUTH DAILY WITH FOOD (Patient taking differently: TAKE 1 TABLET BY MOUTH DAILY WITH FOOD/INSTRUCTED PT TO FOLLOW MD INSTRUCTIONS REGARDING HOLDING FOR SURGERY)   • multivitamin with minerals (MULTIVITAMIN ADULT PO) Take 1 tablet by mouth Daily. HOLD PRIOR TO SURG   • mupirocin (BACTROBAN) 2 % nasal ointment into the nostril(s) as directed by provider. AS DIRECTED PREOP   • Omega-3 Fatty Acids (fish oil) 1000 MG capsule capsule Take 1,000 mg by mouth Daily With Breakfast. HOLD PRIOR TO SURG   • omeprazole  (priLOSEC) 20 MG capsule Take 20 mg by mouth Daily As Needed.   • pravastatin (PRAVACHOL) 20 MG tablet Take 20 mg by mouth Every Night.   • tamsulosin (FLOMAX) 0.4 MG capsule 24 hr capsule Take 1 tablet by mouth daily beginning on 1/11/2022   • Zinc 50 MG tablet Take 1 tablet by mouth Daily. HOLD PRIOR TO SURG     Current Facility-Administered Medications on File Prior to Visit   Medication   • benzoyl peroxide 5 % external liquid       Physical Exam:   81 y.o. male  Body mass index is 23.46 kg/m²., 78.5 kg (173 lb)  Vitals:    01/11/22 1358   Temp: 96.9 °F (36.1 °C)     General: Alert, cooperative, appears well and in no observable distress. Appears stated age and BMI as listed above.  Respiratory: Normal respiratory effort.  Skin: Warm & well perfused; appropriate skin turgor.  Psych: Appropriate mood & affect.    MSK Exam:  R knee:  No deformity or wounds appreciated. No significant redness or warmth.  Trace effusion noted  Tenderness along the joint line appreciated medial.   ROM 0-130 with pain at terminal motion. +crepitus  Ligamentous exam grossly stable  Quad strength 4-4+/5    Brief hip exam in the affected extremity(ies) grossly unremarkable.  Moves ankle and toes up and down, no significant pain or swelling in the foot, ankle or calf.        Radiology:    The following X-rays were ordered/reviewed today to evaluate the patient's symptoms: Single Knee: AP standing and sunrise views of both knees, and lateral view of painful knee show moderate degenerative changes medailly and patellofemoral but still overall reasonable joint space on the AP view. Compared with images from 9/2020 no significant change, perhaps some slight medial narrowing progression..    Procedure:   See Procedure Note: The potential risks and benefits of performing a diagnostic and therapeutic injection were discussed with the patient prior to procedure. Risks include, but are not limited to infection, swelling, transient increase in  pain, bleeding, bruising. Patient was advised that injections are a diagnostic and therapeutic tool meaning they may not alleviate symptoms at all, or may only provide partial or temporary relief.       Misc. Data/Labs: N/A    Assessment & Plan:    82 y/o male with R knee OA. Pain is likely degenerative in nature and images show mild progression of OA. Plan is to proceed with visco today and follow up PRN. He tolerated the procedure well.     Injection provided in the office today. Tolerated well with no immediate complications. Injection precautions discussed with the patient., Patient encouraged to call with questions or concerns prior to follow up.  and Patient instructed on use of ICE therapy PRN for pain and swelling.      ICD-10-CM ICD-9-CM   1. Osteoarthritis of right knee, unspecified osteoarthritis type  M17.11 715.96   2. Right knee pain, unspecified chronicity  M25.561 719.46     No orders of the defined types were placed in this encounter.    Orders Placed This Encounter   Procedures   • Large Joint Arthrocentesis: R knee   • XR Knee 3 View Right     Return if symptoms worsen or fail to improve.    FELIPE Driver    Large Joint Arthrocentesis: R knee  Date/Time: 1/11/2022 1:59 PM  Consent given by: patient  Site marked: site marked  Timeout: Immediately prior to procedure a time out was called to verify the correct patient, procedure, equipment, support staff and site/side marked as required   Supporting Documentation  Indications: pain   Procedure Details  Location: knee - R knee  Preparation: Patient was prepped and draped in the usual sterile fashion  Needle gauge: 21G.  Approach: anteromedial  Medications administered: 48 mg hylan 48 MG/6ML; 4 mL lidocaine (cardiac)  Patient tolerance: patient tolerated the procedure well with no immediate complications            Dictated Utilizing Dragon Dictation

## 2022-01-13 ENCOUNTER — HOSPITAL ENCOUNTER (OUTPATIENT)
Facility: HOSPITAL | Age: 82
Discharge: HOME | End: 2022-01-13
Attending: ORTHOPAEDIC SURGERY | Admitting: ORTHOPAEDIC SURGERY

## 2022-01-13 ENCOUNTER — ANESTHESIA (OUTPATIENT)
Dept: PERIOP | Facility: HOSPITAL | Age: 82
End: 2022-01-13

## 2022-01-13 ENCOUNTER — APPOINTMENT (OUTPATIENT)
Dept: GENERAL RADIOLOGY | Facility: HOSPITAL | Age: 82
End: 2022-01-13

## 2022-01-13 ENCOUNTER — ANESTHESIA EVENT (OUTPATIENT)
Dept: PERIOP | Facility: HOSPITAL | Age: 82
End: 2022-01-13

## 2022-01-13 VITALS
HEART RATE: 88 BPM | DIASTOLIC BLOOD PRESSURE: 67 MMHG | BODY MASS INDEX: 23.92 KG/M2 | OXYGEN SATURATION: 96 % | SYSTOLIC BLOOD PRESSURE: 124 MMHG | RESPIRATION RATE: 16 BRPM | HEIGHT: 71 IN | WEIGHT: 170.86 LBS | TEMPERATURE: 98.2 F

## 2022-01-13 DIAGNOSIS — M19.019 ARTHRITIS OF SHOULDER: ICD-10-CM

## 2022-01-13 DIAGNOSIS — Z96.611 HISTORY OF RIGHT SHOULDER REPLACEMENT: Primary | ICD-10-CM

## 2022-01-13 PROCEDURE — 25010000002 ONDANSETRON PER 1 MG: Performed by: NURSE ANESTHETIST, CERTIFIED REGISTERED

## 2022-01-13 PROCEDURE — 97535 SELF CARE MNGMENT TRAINING: CPT

## 2022-01-13 PROCEDURE — 25010000002 MIDAZOLAM PER 1 MG: Performed by: ANESTHESIOLOGY

## 2022-01-13 PROCEDURE — 25010000002 PROPOFOL 10 MG/ML EMULSION: Performed by: NURSE ANESTHETIST, CERTIFIED REGISTERED

## 2022-01-13 PROCEDURE — 0 CEFAZOLIN IN DEXTROSE 2-4 GM/100ML-% SOLUTION: Performed by: ORTHOPAEDIC SURGERY

## 2022-01-13 PROCEDURE — 25010000002 FENTANYL CITRATE (PF) 50 MCG/ML SOLUTION: Performed by: ANESTHESIOLOGY

## 2022-01-13 PROCEDURE — G0378 HOSPITAL OBSERVATION PER HR: HCPCS

## 2022-01-13 PROCEDURE — 25010000002 VANCOMYCIN 1 G RECONSTITUTED SOLUTION 1 EACH VIAL: Performed by: ORTHOPAEDIC SURGERY

## 2022-01-13 PROCEDURE — 23472 RECONSTRUCT SHOULDER JOINT: CPT | Performed by: NURSE PRACTITIONER

## 2022-01-13 PROCEDURE — C1889 IMPLANT/INSERT DEVICE, NOC: HCPCS | Performed by: ORTHOPAEDIC SURGERY

## 2022-01-13 PROCEDURE — 25010000002 DEXAMETHASONE PER 1 MG: Performed by: NURSE ANESTHETIST, CERTIFIED REGISTERED

## 2022-01-13 PROCEDURE — C1776 JOINT DEVICE (IMPLANTABLE): HCPCS | Performed by: ORTHOPAEDIC SURGERY

## 2022-01-13 PROCEDURE — 25010000002 NEOSTIGMINE 5 MG/10ML SOLUTION: Performed by: NURSE ANESTHETIST, CERTIFIED REGISTERED

## 2022-01-13 PROCEDURE — 23472 RECONSTRUCT SHOULDER JOINT: CPT | Performed by: ORTHOPAEDIC SURGERY

## 2022-01-13 PROCEDURE — 76942 ECHO GUIDE FOR BIOPSY: CPT | Performed by: ORTHOPAEDIC SURGERY

## 2022-01-13 PROCEDURE — C1713 ANCHOR/SCREW BN/BN,TIS/BN: HCPCS | Performed by: ORTHOPAEDIC SURGERY

## 2022-01-13 PROCEDURE — 97165 OT EVAL LOW COMPLEX 30 MIN: CPT

## 2022-01-13 PROCEDURE — C9290 INJ, BUPIVACAINE LIPOSOME: HCPCS | Performed by: ANESTHESIOLOGY

## 2022-01-13 PROCEDURE — 0 BUPIVACAINE LIPOSOME 1.3 % SUSPENSION: Performed by: ANESTHESIOLOGY

## 2022-01-13 PROCEDURE — 25010000002 PHENYLEPHRINE 10 MG/ML SOLUTION: Performed by: NURSE ANESTHETIST, CERTIFIED REGISTERED

## 2022-01-13 PROCEDURE — 25010000002 VANCOMYCIN 10 G RECONSTITUTED SOLUTION: Performed by: ORTHOPAEDIC SURGERY

## 2022-01-13 PROCEDURE — 73020 X-RAY EXAM OF SHOULDER: CPT

## 2022-01-13 DEVICE — STEM HUM/SHLDR COMPREHENSIVE MINI 16X83MM: Type: IMPLANTABLE DEVICE | Site: SHOULDER | Status: FUNCTIONAL

## 2022-01-13 DEVICE — PIN STNMAN 3.2MM 9IN: Type: IMPLANTABLE DEVICE | Site: SHOULDER | Status: FUNCTIONAL

## 2022-01-13 DEVICE — COMP SHLDR GLENOID ALLIANCE 4PEG SZ5: Type: IMPLANTABLE DEVICE | Site: SHOULDER | Status: FUNCTIONAL

## 2022-01-13 DEVICE — TOTL SHLDER: Type: IMPLANTABLE DEVICE | Site: SHOULDER | Status: FUNCTIONAL

## 2022-01-13 DEVICE — SUT NONABS MAXBRAID/PE NMBR2 C7 38IN BLU 900335: Type: IMPLANTABLE DEVICE | Site: SHOULDER | Status: FUNCTIONAL

## 2022-01-13 DEVICE — COMP HUM COMPNSV VERSADL STD TI46X18X53: Type: IMPLANTABLE DEVICE | Site: SHOULDER | Status: FUNCTIONAL

## 2022-01-13 DEVICE — POST MOD GLEN ALLIANCE/TM: Type: IMPLANTABLE DEVICE | Site: SHOULDER | Status: FUNCTIONAL

## 2022-01-13 DEVICE — HD HUM COMPRNSV VERSADIAL 50X21X57MM: Type: IMPLANTABLE DEVICE | Site: SHOULDER | Status: FUNCTIONAL

## 2022-01-13 DEVICE — CMT BONE R 1X40: Type: IMPLANTABLE DEVICE | Site: SHOULDER | Status: FUNCTIONAL

## 2022-01-13 DEVICE — DEV CONTRL TISS STRATAFIX SPIRAL MNCRYL UD 3/0 PLS 30CM: Type: IMPLANTABLE DEVICE | Site: SHOULDER | Status: FUNCTIONAL

## 2022-01-13 RX ORDER — EPHEDRINE SULFATE 50 MG/ML
5 INJECTION, SOLUTION INTRAVENOUS ONCE AS NEEDED
Status: DISCONTINUED | OUTPATIENT
Start: 2022-01-13 | End: 2022-01-13 | Stop reason: HOSPADM

## 2022-01-13 RX ORDER — ONDANSETRON 2 MG/ML
4 INJECTION INTRAMUSCULAR; INTRAVENOUS ONCE AS NEEDED
Status: DISCONTINUED | OUTPATIENT
Start: 2022-01-13 | End: 2022-01-13 | Stop reason: HOSPADM

## 2022-01-13 RX ORDER — HYDROCODONE BITARTRATE AND ACETAMINOPHEN 7.5; 325 MG/1; MG/1
1 TABLET ORAL ONCE AS NEEDED
Status: DISCONTINUED | OUTPATIENT
Start: 2022-01-13 | End: 2022-01-13 | Stop reason: HOSPADM

## 2022-01-13 RX ORDER — HYDRALAZINE HYDROCHLORIDE 20 MG/ML
5 INJECTION INTRAMUSCULAR; INTRAVENOUS
Status: DISCONTINUED | OUTPATIENT
Start: 2022-01-13 | End: 2022-01-13 | Stop reason: HOSPADM

## 2022-01-13 RX ORDER — GABAPENTIN 300 MG/1
300 CAPSULE ORAL 2 TIMES DAILY PRN
Qty: 60 CAPSULE | Refills: 0 | Status: SHIPPED | OUTPATIENT
Start: 2022-01-13

## 2022-01-13 RX ORDER — EPHEDRINE SULFATE 50 MG/ML
INJECTION, SOLUTION INTRAVENOUS AS NEEDED
Status: DISCONTINUED | OUTPATIENT
Start: 2022-01-13 | End: 2022-01-13 | Stop reason: SURG

## 2022-01-13 RX ORDER — HYDROMORPHONE HYDROCHLORIDE 1 MG/ML
0.25 INJECTION, SOLUTION INTRAMUSCULAR; INTRAVENOUS; SUBCUTANEOUS
Status: DISCONTINUED | OUTPATIENT
Start: 2022-01-13 | End: 2022-01-13 | Stop reason: HOSPADM

## 2022-01-13 RX ORDER — FENTANYL CITRATE 50 UG/ML
INJECTION, SOLUTION INTRAMUSCULAR; INTRAVENOUS
Status: COMPLETED | OUTPATIENT
Start: 2022-01-13 | End: 2022-01-13

## 2022-01-13 RX ORDER — DIPHENHYDRAMINE HYDROCHLORIDE 50 MG/ML
12.5 INJECTION INTRAMUSCULAR; INTRAVENOUS
Status: DISCONTINUED | OUTPATIENT
Start: 2022-01-13 | End: 2022-01-13 | Stop reason: HOSPADM

## 2022-01-13 RX ORDER — IBUPROFEN 600 MG/1
600 TABLET ORAL ONCE AS NEEDED
Status: DISCONTINUED | OUTPATIENT
Start: 2022-01-13 | End: 2022-01-13 | Stop reason: HOSPADM

## 2022-01-13 RX ORDER — ACETAMINOPHEN 500 MG
500 TABLET ORAL 2 TIMES DAILY PRN
Qty: 60 TABLET | Refills: 0 | Status: SHIPPED | OUTPATIENT
Start: 2022-01-13

## 2022-01-13 RX ORDER — LIDOCAINE HYDROCHLORIDE 10 MG/ML
0.5 INJECTION, SOLUTION EPIDURAL; INFILTRATION; INTRACAUDAL; PERINEURAL ONCE AS NEEDED
Status: DISCONTINUED | OUTPATIENT
Start: 2022-01-13 | End: 2022-01-13 | Stop reason: HOSPADM

## 2022-01-13 RX ORDER — NALOXONE HCL 0.4 MG/ML
0.2 VIAL (ML) INJECTION AS NEEDED
Status: DISCONTINUED | OUTPATIENT
Start: 2022-01-13 | End: 2022-01-13 | Stop reason: HOSPADM

## 2022-01-13 RX ORDER — ONDANSETRON 4 MG/1
4 TABLET, FILM COATED ORAL EVERY 8 HOURS PRN
Qty: 30 TABLET | Refills: 0 | Status: SHIPPED | OUTPATIENT
Start: 2022-01-13

## 2022-01-13 RX ORDER — FAMOTIDINE 10 MG/ML
20 INJECTION, SOLUTION INTRAVENOUS ONCE
Status: COMPLETED | OUTPATIENT
Start: 2022-01-13 | End: 2022-01-13

## 2022-01-13 RX ORDER — DIPHENHYDRAMINE HCL 25 MG
25 CAPSULE ORAL
Status: DISCONTINUED | OUTPATIENT
Start: 2022-01-13 | End: 2022-01-13 | Stop reason: HOSPADM

## 2022-01-13 RX ORDER — DOCUSATE SODIUM 100 MG/1
100 CAPSULE, LIQUID FILLED ORAL 2 TIMES DAILY
Qty: 60 CAPSULE | Refills: 0 | Status: SHIPPED | OUTPATIENT
Start: 2022-01-13

## 2022-01-13 RX ORDER — FLUMAZENIL 0.1 MG/ML
0.2 INJECTION INTRAVENOUS AS NEEDED
Status: DISCONTINUED | OUTPATIENT
Start: 2022-01-13 | End: 2022-01-13 | Stop reason: HOSPADM

## 2022-01-13 RX ORDER — SODIUM CHLORIDE, SODIUM LACTATE, POTASSIUM CHLORIDE, CALCIUM CHLORIDE 600; 310; 30; 20 MG/100ML; MG/100ML; MG/100ML; MG/100ML
9 INJECTION, SOLUTION INTRAVENOUS CONTINUOUS
Status: DISCONTINUED | OUTPATIENT
Start: 2022-01-13 | End: 2022-01-13 | Stop reason: HOSPADM

## 2022-01-13 RX ORDER — LIDOCAINE HYDROCHLORIDE 20 MG/ML
INJECTION, SOLUTION INFILTRATION; PERINEURAL AS NEEDED
Status: DISCONTINUED | OUTPATIENT
Start: 2022-01-13 | End: 2022-01-13 | Stop reason: SURG

## 2022-01-13 RX ORDER — OXYCODONE AND ACETAMINOPHEN 7.5; 325 MG/1; MG/1
1 TABLET ORAL EVERY 4 HOURS PRN
Status: DISCONTINUED | OUTPATIENT
Start: 2022-01-13 | End: 2022-01-13 | Stop reason: HOSPADM

## 2022-01-13 RX ORDER — PROMETHAZINE HYDROCHLORIDE 25 MG/1
25 SUPPOSITORY RECTAL ONCE AS NEEDED
Status: DISCONTINUED | OUTPATIENT
Start: 2022-01-13 | End: 2022-01-13 | Stop reason: HOSPADM

## 2022-01-13 RX ORDER — HYDROCODONE BITARTRATE AND ACETAMINOPHEN 7.5; 325 MG/1; MG/1
1-2 TABLET ORAL EVERY 4 HOURS PRN
Qty: 42 TABLET | Refills: 0 | Status: SHIPPED | OUTPATIENT
Start: 2022-01-13 | End: 2022-02-23

## 2022-01-13 RX ORDER — DEXAMETHASONE SODIUM PHOSPHATE 10 MG/ML
INJECTION INTRAMUSCULAR; INTRAVENOUS AS NEEDED
Status: DISCONTINUED | OUTPATIENT
Start: 2022-01-13 | End: 2022-01-13 | Stop reason: SURG

## 2022-01-13 RX ORDER — BUPIVACAINE HYDROCHLORIDE 2.5 MG/ML
INJECTION, SOLUTION EPIDURAL; INFILTRATION; INTRACAUDAL
Status: DISCONTINUED | OUTPATIENT
Start: 2022-01-13 | End: 2022-01-13 | Stop reason: SURG

## 2022-01-13 RX ORDER — MAGNESIUM HYDROXIDE 1200 MG/15ML
LIQUID ORAL AS NEEDED
Status: DISCONTINUED | OUTPATIENT
Start: 2022-01-13 | End: 2022-01-13 | Stop reason: HOSPADM

## 2022-01-13 RX ORDER — LABETALOL HYDROCHLORIDE 5 MG/ML
5 INJECTION, SOLUTION INTRAVENOUS
Status: DISCONTINUED | OUTPATIENT
Start: 2022-01-13 | End: 2022-01-13 | Stop reason: HOSPADM

## 2022-01-13 RX ORDER — PROPOFOL 10 MG/ML
VIAL (ML) INTRAVENOUS AS NEEDED
Status: DISCONTINUED | OUTPATIENT
Start: 2022-01-13 | End: 2022-01-13 | Stop reason: SURG

## 2022-01-13 RX ORDER — ONDANSETRON 2 MG/ML
INJECTION INTRAMUSCULAR; INTRAVENOUS AS NEEDED
Status: DISCONTINUED | OUTPATIENT
Start: 2022-01-13 | End: 2022-01-13 | Stop reason: SURG

## 2022-01-13 RX ORDER — TRANEXAMIC ACID 100 MG/ML
INJECTION, SOLUTION INTRAVENOUS AS NEEDED
Status: DISCONTINUED | OUTPATIENT
Start: 2022-01-13 | End: 2022-01-13 | Stop reason: SURG

## 2022-01-13 RX ORDER — PHENYLEPHRINE HYDROCHLORIDE 10 MG/ML
INJECTION INTRAVENOUS AS NEEDED
Status: DISCONTINUED | OUTPATIENT
Start: 2022-01-13 | End: 2022-01-13 | Stop reason: SURG

## 2022-01-13 RX ORDER — NEOSTIGMINE METHYLSULFATE 0.5 MG/ML
INJECTION, SOLUTION INTRAVENOUS AS NEEDED
Status: DISCONTINUED | OUTPATIENT
Start: 2022-01-13 | End: 2022-01-13 | Stop reason: SURG

## 2022-01-13 RX ORDER — SODIUM CHLORIDE 0.9 % (FLUSH) 0.9 %
3-10 SYRINGE (ML) INJECTION AS NEEDED
Status: DISCONTINUED | OUTPATIENT
Start: 2022-01-13 | End: 2022-01-13 | Stop reason: HOSPADM

## 2022-01-13 RX ORDER — PROMETHAZINE HYDROCHLORIDE 25 MG/1
25 TABLET ORAL ONCE AS NEEDED
Status: DISCONTINUED | OUTPATIENT
Start: 2022-01-13 | End: 2022-01-13 | Stop reason: HOSPADM

## 2022-01-13 RX ORDER — FENTANYL CITRATE 50 UG/ML
25 INJECTION, SOLUTION INTRAMUSCULAR; INTRAVENOUS
Status: DISCONTINUED | OUTPATIENT
Start: 2022-01-13 | End: 2022-01-13 | Stop reason: HOSPADM

## 2022-01-13 RX ORDER — GLYCOPYRROLATE 0.2 MG/ML
INJECTION INTRAMUSCULAR; INTRAVENOUS AS NEEDED
Status: DISCONTINUED | OUTPATIENT
Start: 2022-01-13 | End: 2022-01-13 | Stop reason: SURG

## 2022-01-13 RX ORDER — MIDAZOLAM HYDROCHLORIDE 1 MG/ML
INJECTION INTRAMUSCULAR; INTRAVENOUS
Status: COMPLETED | OUTPATIENT
Start: 2022-01-13 | End: 2022-01-13

## 2022-01-13 RX ORDER — MIDAZOLAM HYDROCHLORIDE 1 MG/ML
0.5 INJECTION INTRAMUSCULAR; INTRAVENOUS
Status: DISCONTINUED | OUTPATIENT
Start: 2022-01-13 | End: 2022-01-13 | Stop reason: HOSPADM

## 2022-01-13 RX ORDER — SODIUM CHLORIDE 0.9 % (FLUSH) 0.9 %
3 SYRINGE (ML) INJECTION EVERY 12 HOURS SCHEDULED
Status: DISCONTINUED | OUTPATIENT
Start: 2022-01-13 | End: 2022-01-13 | Stop reason: HOSPADM

## 2022-01-13 RX ORDER — CEFAZOLIN SODIUM 2 G/100ML
2 INJECTION, SOLUTION INTRAVENOUS ONCE
Status: COMPLETED | OUTPATIENT
Start: 2022-01-13 | End: 2022-01-13

## 2022-01-13 RX ORDER — FENTANYL CITRATE 50 UG/ML
50 INJECTION, SOLUTION INTRAMUSCULAR; INTRAVENOUS
Status: DISCONTINUED | OUTPATIENT
Start: 2022-01-13 | End: 2022-01-13 | Stop reason: HOSPADM

## 2022-01-13 RX ORDER — ROCURONIUM BROMIDE 10 MG/ML
INJECTION, SOLUTION INTRAVENOUS AS NEEDED
Status: DISCONTINUED | OUTPATIENT
Start: 2022-01-13 | End: 2022-01-13 | Stop reason: SURG

## 2022-01-13 RX ADMIN — PHENYLEPHRINE HYDROCHLORIDE 150 MCG: 10 INJECTION, SOLUTION INTRAVENOUS at 11:56

## 2022-01-13 RX ADMIN — EPHEDRINE SULFATE 10 MG: 50 INJECTION INTRAVENOUS at 12:19

## 2022-01-13 RX ADMIN — PHENYLEPHRINE HYDROCHLORIDE 100 MCG: 10 INJECTION, SOLUTION INTRAVENOUS at 12:19

## 2022-01-13 RX ADMIN — SODIUM CHLORIDE, POTASSIUM CHLORIDE, SODIUM LACTATE AND CALCIUM CHLORIDE: 600; 310; 30; 20 INJECTION, SOLUTION INTRAVENOUS at 13:00

## 2022-01-13 RX ADMIN — GLYCOPYRROLATE 0.3 MG: 0.2 INJECTION INTRAMUSCULAR; INTRAVENOUS at 13:05

## 2022-01-13 RX ADMIN — SODIUM CHLORIDE, POTASSIUM CHLORIDE, SODIUM LACTATE AND CALCIUM CHLORIDE 9 ML/HR: 600; 310; 30; 20 INJECTION, SOLUTION INTRAVENOUS at 10:11

## 2022-01-13 RX ADMIN — PHENYLEPHRINE HYDROCHLORIDE 150 MCG: 10 INJECTION, SOLUTION INTRAVENOUS at 12:58

## 2022-01-13 RX ADMIN — LIDOCAINE HYDROCHLORIDE 80 MG: 20 INJECTION, SOLUTION INFILTRATION; PERINEURAL at 11:38

## 2022-01-13 RX ADMIN — PHENYLEPHRINE HYDROCHLORIDE 150 MCG: 10 INJECTION, SOLUTION INTRAVENOUS at 11:45

## 2022-01-13 RX ADMIN — PHENYLEPHRINE HYDROCHLORIDE 100 MCG: 10 INJECTION, SOLUTION INTRAVENOUS at 12:31

## 2022-01-13 RX ADMIN — EPHEDRINE SULFATE 10 MG: 50 INJECTION INTRAVENOUS at 12:43

## 2022-01-13 RX ADMIN — VANCOMYCIN HYDROCHLORIDE 1250 MG: 10 INJECTION, POWDER, LYOPHILIZED, FOR SOLUTION INTRAVENOUS at 10:10

## 2022-01-13 RX ADMIN — ONDANSETRON 4 MG: 2 INJECTION INTRAMUSCULAR; INTRAVENOUS at 13:05

## 2022-01-13 RX ADMIN — BUPIVACAINE HYDROCHLORIDE 30 ML: 2.5 INJECTION, SOLUTION EPIDURAL; INFILTRATION; INTRACAUDAL; PERINEURAL at 11:10

## 2022-01-13 RX ADMIN — DEXAMETHASONE SODIUM PHOSPHATE 6 MG: 10 INJECTION INTRAMUSCULAR; INTRAVENOUS at 11:43

## 2022-01-13 RX ADMIN — EPHEDRINE SULFATE 10 MG: 50 INJECTION INTRAVENOUS at 12:58

## 2022-01-13 RX ADMIN — TRANEXAMIC ACID 1000 MG: 1 INJECTION, SOLUTION INTRAVENOUS at 12:14

## 2022-01-13 RX ADMIN — BUPIVACAINE 10 ML: 13.3 INJECTION, SUSPENSION, LIPOSOMAL INFILTRATION at 11:10

## 2022-01-13 RX ADMIN — EPHEDRINE SULFATE 10 MG: 50 INJECTION INTRAVENOUS at 12:31

## 2022-01-13 RX ADMIN — FENTANYL CITRATE 50 MCG: 0.05 INJECTION, SOLUTION INTRAMUSCULAR; INTRAVENOUS at 11:10

## 2022-01-13 RX ADMIN — ROCURONIUM BROMIDE 40 MG: 50 INJECTION INTRAVENOUS at 11:38

## 2022-01-13 RX ADMIN — PHENYLEPHRINE HYDROCHLORIDE 100 MCG: 10 INJECTION, SOLUTION INTRAVENOUS at 12:09

## 2022-01-13 RX ADMIN — FAMOTIDINE 20 MG: 10 INJECTION, SOLUTION INTRAVENOUS at 10:11

## 2022-01-13 RX ADMIN — PHENYLEPHRINE HYDROCHLORIDE 150 MCG: 10 INJECTION, SOLUTION INTRAVENOUS at 12:16

## 2022-01-13 RX ADMIN — PROPOFOL 150 MG: 10 INJECTION, EMULSION INTRAVENOUS at 11:38

## 2022-01-13 RX ADMIN — CEFAZOLIN SODIUM 2 G: 2 INJECTION, SOLUTION INTRAVENOUS at 11:24

## 2022-01-13 RX ADMIN — GLYCOPYRROLATE 0.1 MG: 0.2 INJECTION INTRAMUSCULAR; INTRAVENOUS at 11:38

## 2022-01-13 RX ADMIN — NEOSTIGMINE METHYLSULFATE 3 MG: 0.5 INJECTION INTRAVENOUS at 13:05

## 2022-01-13 RX ADMIN — MIDAZOLAM 2 MG: 1 INJECTION INTRAMUSCULAR; INTRAVENOUS at 11:10

## 2022-01-13 RX ADMIN — PROPOFOL 25 MCG/KG/MIN: 10 INJECTION, EMULSION INTRAVENOUS at 11:43

## 2022-01-13 RX ADMIN — ROCURONIUM BROMIDE 10 MG: 50 INJECTION INTRAVENOUS at 12:11

## 2022-01-13 RX ADMIN — PHENYLEPHRINE HYDROCHLORIDE 100 MCG: 10 INJECTION, SOLUTION INTRAVENOUS at 12:43

## 2022-01-13 NOTE — OP NOTE
Orthopaedic Operative Note    Facility: [facility]    Patient: Hu Harris    Medical Record Number: [id number]    YOB: 1940    Dictating Surgeon: Shahzad Saucedo M.D.    Primary Care Physician: Juan Manuel Man MD    Date of Operation:  1/13/2022    PREOPERATIVE DIAGNOSIS: Right shoulder end-stage osteoarthritis    POSTOPERATIVE DIAGNOSIS: Right shoulder end-stage osteoarthritis    PROCEDURES PERFORMED:    1.  Right total shoulder arthroplasty    2.  Tenodesis of long head of biceps tendon    SURGEON: Shahzad Saucedo MD     ASSISTANT: FELIPE Solis whose assistance was critical for help with patient positioning, suctioning and irrigation, retraction, manipulation of the extremity for insertion of the implants, wound closure and application of the bandages.  Her assistance was critical to the success of this case.    ANESTHESIA: Regional followed General solution    SPECIMEN: None.     COMPLICATION: None.     ESTIMATED BLOOD LOSS: Less than 150 mL.     IMPLANTS:     1. Biomet 16mm mini comprehensive stem with size 50 x 21 mm Versa-Dial humeral head    2. Size 5 glenoid with central trabecular metal peg cemented with 1 batch of Palacos bone cement.     INDICATIONS: Mr. Harris had a long history of right shoulder pain which has been progressively worsening. The patient has long-standing osteoarthritis which has been nonresponsive to conservative treatment.    INFORMED CONSENT:  The risks, benefits, and alternatives to a total shoulder arthroplasty were discussed with the patient in detail. The patient acknowledged understanding the information and consented to proceed.  I explained that surgical risks include infection, hematoma, hardware related complications including failure of fixation, loosening, fracture, subscapularis repair failure and/or rotator cuff tear necessitating revision surgery, persistent pain and/or loss of motion, iatrogenic nerve and/or blood vessel injury  resulting in permanent weakness, numbness or dysfunction, DVT, PE, positioning related neuropraxia, and anesthesia related complications resulting in death.  I did explain the possible need for reverse arthroplasty depending upon the degree of retroversion and the status of his rotator cuff at the time of his surgery.  I further explained the risks inherent to reverse arthroplasty as compared to a standard total shoulder.  Specifically, we discussed the risks of notching, glenoid loosening, instability, and traction related neuropraxia, any of which could result in persistent pain or problems requiring further surgery.     DESCRIPTION OF PROCEDURE: The patient and operative site were identified in the preoperative holding area. The surgical site was marked. Following this, adequate regional anesthesia of the right upper extremity was administered. The patient was then taken to the operating room and placed in the supine position. Adequate general anesthesia was administered and then the patient was repositioned on the operating table in the modified beach chair position. The head and neck were placed in neutral alignment and all bony prominences were carefully padded and protected. Once we had the patient appropriately positioned, a timeout was taken. Preoperative antibiotics were administered.     I began the procedure by cleaning the extremity with an alcohol solution, a Hibiclens scrub was performed, and then the extremity was prepped with ChloraPrep x2. I allowed those to dry for 3 minutes before the draping procedure was carried out.  I did not use Ioban due to his history of allergy.  Next, an approximately 8 cm incision was fashioned over the anterior aspect of the shoulder centered over the deltopectoral interval. Full-thickness medial and lateral skin flaps were developed and the cephalic vein dissected out. This structure was retracted laterally and kept protected throughout the duration of the case. The  underlying clavipectoral fascia was divided and the strap muscles were retracted medially. The anterior circumflex vessels were suture-ligated with 3-0 silk ties. The subscapularis was then tagged and released off the lesser tuberosity, careful to leave a small cuff of tissue to allow for a later anatomic repair of that structure.     The long head of the biceps was dissected out of the groove. This was released off of its attachment to the supraglenoid tubercle and then the diseased intraarticular portion of the biceps removed. The biceps was then tenodesed to the pectoralis tendon using two #2 MaxBraid sutures.     Once I completed that process, I then redirected my attention to the humeral side. There were large osteophytes surrounding the periphery of the head. These were removed at this point with a curved osteotome and rongeur. Once I completed that process, I had good exposure of the head. The cuff did appear to be intact and so I determined that we could proceed with a total shoulder arthroplasty. The cutting guide for the head cut was inserted. This was pinned into position at 30 degrees of retroversion as referenced off the forearm. The head cut was then carried out in typical fashion, careful to preserve the rotator cuff attachments during the cutting. The cut portion of the head was taken to the back table and measured. It measured a size 50 in diameter.  Having completed the head cut and removal of the osteophytes, I then directed my attention to the glenoid. I dissected out the axillary nerve and made sure I had that structure identified and protected. Once the nerve was identified and protected, the subscapularis mobilization was performed. I was able to get a 360 degree release of the subscapularis and excellent mobilization of that structure without jeopardizing the axillary nerve.      Once I had completed that process, the glenoid was exposed. The superior, anterior, and inferior glenoid labral  tissues were carefully removed to expose the glenoid. I measured it. It measured a size 5. I pinned the center pin and then reamed and prepared the glenoid in typical fashion.     I had my assistant mix one batch of Palacos bone cement on the back table using current cement mixing technique. Once we completed that process and I drilled the 3 peg holes as well as the central hole for the central trabecular metal peg, the implant was impacted into position with cement in the peg holes and on the back of the implant.  This seated well and was firmly secure. I held that in place until the cement had cured. The excess extruded cement was removed with a [place] elevator. Next, I directed my attention back to the humerus. The humerus was reamed and broached up to a size 16 where good fill was achieved. I elected to go with a 16 stem. I drilled 2 drill holes in the lesser tuberosity and 2 transosseous sutures were placed through those to allow for a combined transosseous/transtendinous repair of the subscapularis. The implant was impacted into position. The size 16 stem seated well.      I then trialed with multiple head components.  The 50 x 21 fit very well and demonstrated excellent motion and stability. I could translate the head roughly 50% posteriorly and 50% inferiorly and reduce right back onto the glenoid. Again, it seemed to fit very well and I was satisfied that was the appropriate size for implantation in this case.     The trial head component was removed and the final component was impacted into position. I took care to make sure the Armstrong taper was fully secure before reducing the shoulder and again carrying it through a range of motion. Again, the shoulder demonstrated excellent motion and stability. Next, I irrigated out with 500 mL of a vancomycin-containing saline solution followed by 1 L of sterile saline mixed with bacitracin via pulsatile lavage.  Vancomycin was utilized instead of Betadine due to his  history of allergy.    I placed the arm in about 30' of external rotation. The subscapularis repair was performed in the typical fashion using the previously placed transosseous sutures as well as multiple transtendinous sutures. Two sutures were used to close down the defect in the rotator interval as well. I was able to get an excellent repair in the subscapularis without any significant tension. The shoulder demonstrated good motion and stability. Therefore, I directed my attention to final closure. I confirmed his axillary nerve was intact at this point and then I irrigated out with another 2 L of sterile saline via pulsatile lavage. I made sure we had good hemostasis.      The wound was then sequentially closed in layers using Vicryl stitches to the deep tissues and a running subcuticular Monocryl stitch followed by Steri-Strips for the skin. Sterile dressings were applied to the wound and the drapes withdrawn. The patient tolerated the procedure well. There were no complications. Mr. Harris was taken to the recovery room in good condition. The arm was placed in a sling prior to transfer to the recovery room.     Shahzad Saucedo M.D.  1/13/2022    cc: Juan Manuel Man MD

## 2022-01-13 NOTE — ANESTHESIA PROCEDURE NOTES
Peripheral Block    Pre-sedation assessment completed: 1/13/2022 11:10 AM    Patient reassessed immediately prior to procedure    Patient location during procedure: holding area  Start time: 1/13/2022 11:10 AM  Stop time: 1/13/2022 11:20 AM  Reason for block: at surgeon's request and post-op pain management  Performed by  Anesthesiologist: Efren Caldwell MD  Preanesthetic Checklist  Completed: patient identified, IV checked, site marked, risks and benefits discussed, surgical consent, monitors and equipment checked, pre-op evaluation and timeout performed  Prep:  Pt Position: sitting  Sterile barriers:cap, gloves, gown, mask and sterile barriers  Prep: ChloraPrep  Patient monitoring: blood pressure monitoring, continuous pulse oximetry and EKG  Procedure    Sedation: yes  Performed under: local infiltration  Guidance:ultrasound guided    ULTRASOUND INTERPRETATION.  Using ultrasound guidance a 21 G gauge needle was placed in close proximity to the brachial plexus nerve, at which point, under ultrasound guidance anesthetic was injected in the area of the nerve and spread of the anesthesia was seen on ultrasound in close proximity thereto.  There were no abnormalities seen on ultrasound; a digital image was taken; and the patient tolerated the procedure with no complications. Images:still images obtained    Laterality:right  Block Type:interscalene  Injection Technique:single-shot  Needle Type:echogenic  Needle Gauge:21 G      Medications Used: bupivacaine liposome (EXPAREL) 1.3 % injection, 10 mL  bupivacaine PF (MARCAINE) 0.25 % injection, 30 mL      Medications  Comment:Ultrasound Interpretation: Using ultrasound guidance, the needle was placed in close proximity to the target nerve and anesthetic was injected in the area of the target nerve and/or bundles, and spread of the anesthetic was seen on ultrasound in close proximity thereto.  There were no abnormalities seen on ultrasound; a digital / physical  image was taken; and the patient tolerated the procedure with no complications.   Block placed for postoperative pain control per surgeon request.    Post Assessment  Injection Assessment: negative aspiration for heme, no paresthesia on injection and incremental injection  Patient Tolerance:comfortable throughout block  Complications:no

## 2022-01-13 NOTE — THERAPY EVALUATION
Patient Name: Hu Harris  : 1940    MRN: [id number]                              Today's Date: 2022       Admit Date: 2022    Visit Dx:     ICD-10-CM ICD-9-CM   1. History of right shoulder replacement  Z96.611 V43.61   2. Arthritis of shoulder  M19.019 716.91     Patient Active Problem List   Diagnosis   • Current tear of meniscus of knee   • Arthritis of shoulder     Past Medical History:   Diagnosis Date   • Anxiety    • Arthritis    • Back pain    • Disease of thyroid gland     graves   • High cholesterol    • Right shoulder pain      Past Surgical History:   Procedure Laterality Date   • FOOT SURGERY Bilateral    • HERNIA REPAIR     • KNEE ARTHROSCOPY Right 2017    Procedure: KNEE ARTHROSCOPY WITH PARTICAL MEDIAL MENISECTOMY AND ARTHRITIS DEBRIDMENT;  Surgeon: Jimena Ho MD;  Location: [facility] OR [facility] [place];  Service:    • KNEE SURGERY Left 1972    partial replacement       General Information     Row Name 22 170          OT Time and Intention    Document Type discharge evaluation/summary  -     Mode of Treatment occupational therapy; individual therapy  -     Row Name 22          General Information    Patient Profile Reviewed yes  -SM     Prior Level of Function independent:; ADL's; all household mobility  -     Existing Precautions/Restrictions non-weight bearing; right; shoulder  -     Row Name 22          Living Environment    Lives With alone  -     Row Name 22          Cognition    Orientation Status (Cognition) oriented x 4  -     Row Name 22          Safety Issues, Functional Mobility    Safety Issues Affecting Function (Mobility) --  pt with many questions today, some anxiety with shoulder precautions but overall is very cautious and demos safe technique  -     Impairments Affecting Function (Mobility) strength; sensation/sensory awareness; range of motion (ROM)  -     Comment, Safety Issues/Impairments  (Mobility) pt wanting to go over information several times, asking multiple questions. He is wanting to make sure he will be able to manage independently once son leaves after 6 days.  -           User Key  (r) = Recorded By, (t) = Taken By, (c) = Cosigned By    Initials Name Provider Type    Bridget Go OT Occupational Therapist                 Mobility/ADL's     Row Name 01/13/22 1703          Bed Mobility    Bed Mobility supine-sit  -     Supine-Sit [place] (Bed Mobility) supervision  -[facility] Name 01/13/22 1703          Transfers    Transfers sit-stand transfer  -     Sit-Stand [place] (Transfers) independent  -[facility] Name 01/13/22 1703          Functional Mobility    Functional Mobility- Ind. Level independent  -[facility] Name 01/13/22 1703          Activities of Daily Living    BADL Assessment/Intervention lower body dressing; upper body dressing  -[facility] Name 01/13/22 1703          Mobility    Extremity Weight-bearing Status right upper extremity  -     Right Upper Extremity (Weight-bearing Status) non weight-bearing (NWB)  -[facility] Name 01/13/22 1703          Lower Body Dressing Assessment/Training    [place] Level (Lower Body Dressing) lower body dressing skills; don; socks; pants/bottoms; supervision  -[facility] Name 01/13/22 1703          Upper Body Dressing Assessment/Training    [place] Level (Upper Body Dressing) upper body dressing skills; don; pull-over garment; moderate assist (50% patient effort)  -     Comment (Upper Body Dressing) OT demo for safe technique with shoulder precautions  -           User Key  (r) = Recorded By, (t) = Taken By, (c) = Cosigned By    Initials Name Provider Type    Bridget Go OT Occupational Therapist               Obj/Interventions     Row Name 01/13/22 1704          Sensory Interventions    Comment, Sensory Intervention surgical block is intact  -[facility] Name 01/13/22 1704          Range of Motion  Comprehensive    Comment, General Range of Motion RUE no active movement due to surgical block  -[facility] Name 01/13/22 1704          Balance    Comment, Balance WFL  -[facility] Name 01/13/22 1704          Therapeutic Exercise    Therapeutic Exercise --  OT educated in [facility] HEP to begin POD 1 including shoulder pendulums and AROM of digits to elbow. Pt able to demo and verbalize understanding  -           User Key  (r) = Recorded By, (t) = Taken By, (c) = Cosigned By    Initials Name Provider Type    Bridget Go OT Occupational Therapist               Goals/Plan    No documentation.                Clinical Impression     [facility] Name 01/13/22 1706          Pain Assessment    Additional Documentation Pain Scale: Numbers Pre/Post-Treatment (Group)  -SM     Row Name 01/13/22 1706          Pain Scale: Numbers Pre/Post-Treatment    Pretreatment Pain Rating 0/10 - no pain  -SM     Row Name 01/13/22 1706          Plan of Care Review    Plan of Care Reviewed With patient; son  -     Outcome Summary Pt is a 81 y.o male POD 0 from [facility], he plans to dc home today. OT saw pt down in recovery. Pt educated in shoulder precautions, HEP, sling management, dressing technique. Pt reports he has been practing L handed ADLs in prep to surgery and does well with all education. Extra time spent answering pt and son questions regarding exercises and ADL as well as how to take on and off sling. He plans to dc home today.  -[facility] Name 01/13/22 1706          Therapy Assessment/Plan (OT)    Criteria for Skilled Therapeutic Interventions Met (OT) skilled treatment is necessary; yes  -     Therapy Frequency (OT) evaluation only  -SM     Row Name 01/13/22 1706          Therapy Plan Review/Discharge Plan (OT)    Anticipated Discharge Disposition (OT) home with outpatient therapy services; home with assist  son staying for 6 days to help  -[facility] Name 01/13/22 1706          Positioning and Restraints    Pre-Treatment  Position in bed  -SM     Post Treatment Position chair  -SM     In Chair sitting; encouraged to call for assist; with family/caregiver; notified nsg  -SM           User Key  (r) = Recorded By, (t) = Taken By, (c) = Cosigned By    Initials Name Provider Type    Bridget Go OT Occupational Therapist               Outcome Measures     Row Name 01/13/22 1708          How much help from another is currently needed...    Putting on and taking off regular lower body clothing? 3  -SM     Bathing (including washing, rinsing, and drying) 3  -SM     Toileting (which includes using toilet bed pan or urinal) 3  -SM     Putting on and taking off regular upper body clothing 3  -SM     Taking care of personal grooming (such as brushing teeth) 3  -SM     Eating meals 3  -SM     AM-PAC 6 Clicks Score (OT) 18  -SM     Row Name 01/13/22 1708          Functional Assessment    Outcome Measure Options AM-PAC 6 Clicks Daily Activity (OT)  -SM           User Key  (r) = Recorded By, (t) = Taken By, (c) = Cosigned By    Initials Name Provider Type    Bridget Go OT Occupational Therapist                Occupational Therapy Education                 Title: PT OT SLP Therapies (In Progress)     Topic: Occupational Therapy (In Progress)     Point: ADL training (Done)     Description:   Instruct learner(s) on proper safety adaptation and remediation techniques during self care or transfers.   Instruct in proper use of assistive devices.              Learning Progress Summary           Patient Acceptance, E,H, VU,DU by  at 1/13/2022 1709    Comment: shoulder precautions, HEP, don/doff position of sline, janel dressing technique   Significant Other Acceptance, E,H, VU,DU by  at 1/13/2022 1709    Comment: shoulder precautions, HEP, don/doff position of sline, janel dressing technique                   Point: Home exercise program (Done)     Description:   Instruct learner(s) on appropriate technique for monitoring, assisting  and/or progressing therapeutic exercises/activities.              Learning Progress Summary           Patient Acceptance, E,H, VU,DU by  at 1/13/2022 1709    Comment: shoulder precautions, HEP, don/doff position of sline, janel dressing technique   Significant Other Acceptance, E,H, VU,DU by  at 1/13/2022 1709    Comment: shoulder precautions, HEP, don/doff position of sline, janel dressing technique                   Point: Precautions (Done)     Description:   Instruct learner(s) on prescribed precautions during self-care and functional transfers.              Learning Progress Summary           Patient Acceptance, E,H, VU,DU by  at 1/13/2022 1709    Comment: shoulder precautions, HEP, don/doff position of sline, janel dressing technique   Significant Other Acceptance, E,H, VU,DU by  at 1/13/2022 1709    Comment: shoulder precautions, HEP, don/doff position of sline, janel dressing technique                   Point: Body mechanics (Not Started)     Description:   Instruct learner(s) on proper positioning and spine alignment during self-care, functional mobility activities and/or exercises.              Learner Progress:  Not documented in this visit.                      User Key     Initials Effective Dates Name Provider Type Discipline     04/02/20 -  Bridget Barnett OT Occupational Therapist OT              OT Recommendation and Plan  Therapy Frequency (OT): evaluation only  Plan of Care Review  Plan of Care Reviewed With: patient, son  Outcome Summary: Pt is a 81 y.o male POD 0 from [facility], he plans to dc home today. OT saw pt down in recovery. Pt educated in shoulder precautions, HEP, sling management, dressing technique. Pt reports he has been practing L handed ADLs in prep to surgery and does well with all education. Extra time spent answering pt and son questions regarding exercises and ADL as well as how to take on and off sling. He plans to dc home today.     Time Calculation:    Time  Calculation- OT     Row Name 01/13/22 1710             Time Calculation- OT    OT Start Time 1621  -SM      OT Stop Time 1659  -SM      OT Time Calculation (min) 38 min  -SM      Total Timed Code Minutes- OT 25 minute(s)  -SM      OT Received On 01/13/22  -              Timed Charges    72382 - OT Self Care/Mgmt Minutes 25  -SM              Untimed Charges    OT Eval/Re-eval Minutes 13  -SM              Total Minutes    Timed Charges Total Minutes 25  -SM      Untimed Charges Total Minutes 13  -SM       Total Minutes 38  -SM            User Key  (r) = Recorded By, (t) = Taken By, (c) = Cosigned By    Initials Name Provider Type     Bridget Barnett, OT Occupational Therapist              Therapy Charges for Today     Code Description Service Date Service Provider Modifiers Qty    99697280906 HC OT EVAL LOW COMPLEXITY 2 1/13/2022 Bridget Barnett OT GO 1    28725115901 HC OT SELF CARE/MGMT/TRAIN EA 15 MIN 1/13/2022 Bridget Barnett OT GO 2               Bridget Barnett OT  1/13/2022

## 2022-01-13 NOTE — ANESTHESIA POSTPROCEDURE EVALUATION
Patient: Hu Harris    Procedure Summary     Date: 01/13/22 Room / Location: [facility] OR 09 / [facility] MAIN OR    Anesthesia Start: 1130 Anesthesia Stop: 1340    Procedure: TOTAL SHOULDER ARTHROPLASTY (Right Shoulder) Diagnosis:       Arthritis of shoulder      (Arthritis of shoulder [M19.019])    Surgeons: Shahzad Saucedo MD Provider: Efren Caldwell MD    Anesthesia Type: general with block ASA Status: 3          Anesthesia Type: general with block    Vitals  Vitals Value Taken Time   /70 01/13/22 1501   Temp 36.4 °C (97.6 °F) 01/13/22 1338   Pulse 85 01/13/22 1504   Resp 18 01/13/22 1500   SpO2 95 % 01/13/22 1504   Vitals shown include unvalidated device data.        Post Anesthesia Care and Evaluation    Patient location during evaluation: PACU  Patient participation: complete - patient participated  Level of consciousness: awake and alert  Pain management: adequate  Airway patency: patent  Anesthetic complications: No anesthetic complications    Cardiovascular status: acceptable  Respiratory status: acceptable  Hydration status: acceptable    Comments: --------------------            01/13/22               1500     --------------------   BP:       127/70     Pulse:      82       Resp:       18       Temp:                SpO2:      95%      --------------------

## 2022-01-13 NOTE — BRIEF OP NOTE
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]    Procedure/CPT® Codes:        Procedure(s):  TOTAL SHOULDER ARTHROPLASTY, biceps tenodesis    Surgeon(s):  Shahzad Saucedo MD    Anesthesia: General with Block    Staff:   Circulator: Ale Zelaya RN; Alberto Perez RN  Scrub Person: Simón Byrnes  Assistant: Lia Baeza APRN  Assistant: Lia Baeza APRN      Estimated Blood Loss: 100ml    Urine Voided: * No values recorded between 1/13/2022 11:32 AM and 1/13/2022 12:03 PM *    Specimens:                None          Drains: * No LDAs found *    Findings: see dictation    Complications: none    Assistant: iLa Baeza APRN  was responsible for performing the following activities: Retraction and their skilled assistance was necessary for the success of this case.    Shahzad Saucedo MD     Date: 1/13/2022  Time:  1311

## 2022-01-13 NOTE — ANESTHESIA PREPROCEDURE EVALUATION
Anesthesia Evaluation     Patient summary reviewed and Nursing notes reviewed   history of anesthetic complications (pt has had teeth chipped with previous intubation ):  NPO Solid Status: > 8 hours  NPO Liquid Status: > 8 hours           Airway   Mallampati: II  TM distance: >3 FB  Neck ROM: limited  Possible difficult intubation  Dental          Pulmonary - negative pulmonary ROS   (-) asthma, sleep apnea, rhonchi, decreased breath sounds, wheezes, not a smoker  Cardiovascular   Exercise tolerance: good (4-7 METS)    ECG reviewed  Rhythm: regular  Rate: normal    (+) hyperlipidemia,   (-) hypertension, CAD, angina, JONES, murmur      Neuro/Psych  (+) psychiatric history Anxiety,     (-) CVA  GI/Hepatic/Renal/Endo    (+)   thyroid problem (graves disease)   (-) no renal disease, diabetes    Musculoskeletal     (+) back pain, neck pain, neck stiffness,   Abdominal     Abdomen: soft.   Substance History - negative use     OB/GYN negative ob/gyn ROS         Other   arthritis,                    Anesthesia Plan    ASA 3     general with block   (R ISB with exparel PSR for POPC  CMAC for intubation 2/2 limited motion    I have reviewed the patient's history with the patient and the chart, including all pertinent laboratory results and imaging. I have explained the risks of anesthesia including but not limited to dental damage, corneal abrasion, nerve injury, MI, stroke, and death. Questions asked and answered. Anesthetic plan discussed with patient and team as indicated. Patient expressed understanding of the above.    )  intravenous induction     Anesthetic plan, all risks, benefits, and alternatives have been provided, discussed and informed consent has been obtained with: patient.

## 2022-01-13 NOTE — ANESTHESIA PROCEDURE NOTES
Airway  Urgency: elective    Date/Time: 1/13/2022 11:41 AM  Airway not difficult    General Information and Staff    Patient location during procedure: OR  Anesthesiologist: Efren Caldwell MD  CRNA: Ale Holland CRNA    Indications and Patient Condition  Indications for airway management: airway protection    Preoxygenated: yes  MILS maintained throughout  Mask difficulty assessment: 2 - vent by mask + OA or adjuvant +/- NMBA    Final Airway Details  Final airway type: endotracheal airway      Successful airway: ETT  Cuffed: yes   Successful intubation technique: video laryngoscopy  Facilitating devices/methods: intubating stylet  Endotracheal tube insertion site: oral  Blade: CMAC  Blade size: D  ETT size (mm): 7.0  Cormack-Lehane Classification: grade I - full view of glottis  Placement verified by: chest auscultation and capnometry   Cuff volume (mL): 6  Measured from: teeth  ETT/EBT  to teeth (cm): 22  Number of attempts at approach: 1  Assessment: lips, teeth, and gum same as pre-op and atraumatic intubation

## 2022-01-13 NOTE — PLAN OF CARE
Goal Outcome Evaluation:  Plan of Care Reviewed With: patient, son           Outcome Summary: Pt is a 81 y.o male POD 0 from [facility], he plans to dc home today. OT saw pt down in recovery. Pt educated in shoulder precautions, HEP, sling management, dressing technique. Pt reports he has been practing L handed ADLs in prep to surgery and does well with all education. Extra time spent answering pt and son questions regarding exercises and ADL as well as how to take on and off sling. He plans to dc home today.    OT wore a mask, eye protection, gloves, and completed hand hygiene prior/after session. Pt wore a mask.

## 2022-01-14 ENCOUNTER — TELEPHONE (OUTPATIENT)
Dept: ORTHOPEDIC SURGERY | Facility: CLINIC | Age: 82
End: 2022-01-14

## 2022-01-14 NOTE — TELEPHONE ENCOUNTER
Rough nite last night, patient is having a little anxiety.  They did listen to your message.  The son is sending a mychart message too. I explained that you were seeing patients so it might be challenging for you to call them at this moment.

## 2022-01-16 ENCOUNTER — NURSE TRIAGE (OUTPATIENT)
Dept: CALL CENTER | Facility: HOSPITAL | Age: 82
End: 2022-01-16

## 2022-01-17 NOTE — TELEPHONE ENCOUNTER
Reviewed AVS with caller and teaching on medication administration    Reason for Disposition  • Caller has medicine question only, adult not sick, AND triager answers question    Additional Information  • Negative: [1] Intentional drug overdose AND [2] suicidal thoughts or ideas  • Negative: Drug overdose and triager unable to answer question  • Negative: Caller requesting information unrelated to medicine  • Negative: Caller requesting information about COVID-19 Vaccine  • Negative: Caller requesting information about Emergency Contraception  • Negative: Caller requesting information about Combined Birth Control Pills  • Negative: Caller requesting information about Progestin Birth Control Pills  • Negative: Caller requesting information about Post-Op pain or medicines  • Negative: Caller requesting a prescription antibiotic (such as Penicillin) for Strep throat and has a positive culture result  • Negative: Caller requesting a prescription anti-viral med (such as Tamiflu) and has influenza (flu)  symptoms  • Negative: Immunization reaction suspected  • Negative: Rash while taking a medicine or within 3 days of stopping it  • Negative: [1] Asthma and [2] having symptoms of asthma (cough, wheezing, etc.)  • Negative: [1] Symptom of illness (e.g., headache, abdominal pain, earache, vomiting) AND [2] more than mild  • Negative: Breastfeeding questions about mother's medicines and diet  • Negative: MORE THAN A DOUBLE DOSE of a prescription or over-the-counter (OTC) drug  • Negative: [1] DOUBLE DOSE (an extra dose or lesser amount) of prescription drug AND [2] any symptoms (e.g., dizziness, nausea, pain, sleepiness)  • Negative: [1] DOUBLE DOSE (an extra dose or lesser amount) of over-the-counter (OTC) drug AND [2] any symptoms (e.g., dizziness, nausea, pain, sleepiness)  • Negative: Took another person's prescription drug  • Negative: [1] DOUBLE DOSE (an extra dose or lesser amount) of prescription drug AND [2] NO  symptoms (Exception: a double dose of antibiotics)  • Negative: Diabetes drug error or overdose (e.g., took wrong type of insulin or took extra dose)  • Negative: [1] Prescription refill request for ESSENTIAL medicine (i.e., likelihood of harm to patient if not taken) AND [2] triager unable to refill per department policy  • Negative: [1] Prescription not at pharmacy AND [2] was prescribed by PCP recently (Exception: triager has access to EMR and prescription is recorded there. Go to Home Care and confirm for pharmacy.)  • Negative: [1] Pharmacy calling with prescription question AND [2] triager unable to answer question  • Negative: [1] Caller has URGENT medicine question about med that PCP or specialist prescribed AND [2] triager unable to answer question  • Negative: Medicine patch causing local rash or itching  • Negative: [1] Caller has medicine question about med NOT prescribed by PCP AND [2] triager unable to answer question (e.g., compatibility with other med, storage)  • Negative: Prescription request for new medicine (not a refill)  • Negative: Prescription refill request for a CONTROLLED substance (e.g., narcotics, ADHD medicines)  • Negative: [1] Prescription refill request for NON-ESSENTIAL medicine (i.e., no harm to patient if med not taken) AND [2] triager unable to refill per department policy  • Negative: [1] Caller has NON-URGENT medicine question about med that PCP prescribed AND [2] triager unable to answer question  • Negative: Caller wants to use a complementary or alternative medicine  • Negative: [1] Prescription prescribed recently is not at pharmacy AND [2] triager has access to patient's EMR AND [3] prescription is recorded in the EMR  • Negative: [1] DOUBLE DOSE (an extra dose or lesser amount) of over-the-counter (OTC) drug AND [2] NO symptoms  • Negative: [1] DOUBLE DOSE (an extra dose or lesser amount) of antibiotic drug AND [2] NO symptoms    Answer Assessment - Initial Assessment  "Questions  1. NAME of MEDICATION: \"What medicine are you calling about?\"      Norco, Gabapentin and Tylenol  2. QUESTION: \"What is your question?\" (e.g., medication refill, side effect)      When should these be taken  3. PRESCRIBING HCP: \"Who prescribed it?\" Reason: if prescribed by specialist, call should be referred to that group.      Surgeon  4. SYMPTOMS: \"Do you have any symptoms?\"      Pain  5. SEVERITY: If symptoms are present, ask \"Are they mild, moderate or severe?\"      Controlled with pain medication  6. PREGNANCY:  \"Is there any chance that you are pregnant?\" \"When was your last menstrual period?\"      NA    Protocols used: MEDICATION QUESTION CALL-ADULT-      "

## 2022-01-18 ENCOUNTER — TELEPHONE (OUTPATIENT)
Dept: ORTHOPEDIC SURGERY | Facility: HOSPITAL | Age: 82
End: 2022-01-18

## 2022-01-18 NOTE — TELEPHONE ENCOUNTER
Post op day 5  Discharge Instructions:  Ask patient about his or her discharge instructions  ?  Patient confirmed understanding   ?  Further instruction needed   What, if any, recommendations, teaching, or interventions did you provide? Click or tap here to enter text.  Health status:  Pain controlled Yes   He said he is having some discomfort, taking minimal pain medication   Recommended interventions:  No  Incision/dressing status   ?  Clean without redness, drainage, odor  ?  Redness    ?  Drainage - color Click or tap here to enter text.  ?  Odor  ANANTH - Green light blinking Choose an item.  Difficulties urination No  Last BM 1/18/2022 (if no BM by day 3-recommend OTC suppository or fleets enema)  He was having an issue with constipation. He has since had a couple good BM’s and feels like things are getting regular.   Medications:  ?Medications reviewed with patient/family/caregiver  Patient taking medications as prescribed?   Yes  If not taking medications as prescribed, note specific medicine(s) and reason for each:  Click or tap here to enter text.  Hospital Follow Up Plan:  Follow up Appointment with Orthopedic surgeon:  ?Has f/u appointment                ?Scheduled f/u appointment  Home Care ordered at discharge?    No        Home Care started, or contact made?    No   If no, action taken: Total Shoulder   DME obtained/used in home?         Yes Sling  Other information: Mr. Harris said he is doing well. He had some concerns that first night because of swelling and the block, but everything has resolved. He is able to take the brace off and on, dress and undress, as well as bathe by himself. He is completing all the exercises given to him by OT. He is to start OP PT Thursday. He seems to be ahead of the game. He was wondering if he could be released from the brace early, explained that is up to the MD. He doesn’t have any questions for me at this time. Mr. Harris has my contact information should he need  anything. He voiced understanding.

## 2022-01-23 ENCOUNTER — NURSE TRIAGE (OUTPATIENT)
Dept: CALL CENTER | Facility: HOSPITAL | Age: 82
End: 2022-01-23

## 2022-01-24 NOTE — TELEPHONE ENCOUNTER
Caller asking which pain med would be best to take before bed. Information given about both hydrocodone and tylenol extra strength.     Reason for Disposition  • Caller has medicine question only, adult not sick, AND triager answers question    Additional Information  • Negative: [1] Intentional drug overdose AND [2] suicidal thoughts or ideas  • Negative: Drug overdose and triager unable to answer question  • Negative: Caller requesting information unrelated to medicine  • Negative: Caller requesting information about COVID-19 Vaccine  • Negative: Caller requesting information about Emergency Contraception  • Negative: Caller requesting information about Combined Birth Control Pills  • Negative: Caller requesting information about Progestin Birth Control Pills  • Negative: Caller requesting information about Post-Op pain or medicines  • Negative: Caller requesting a prescription antibiotic (such as Penicillin) for Strep throat and has a positive culture result  • Negative: Caller requesting a prescription anti-viral med (such as Tamiflu) and has influenza (flu)  symptoms  • Negative: Immunization reaction suspected  • Negative: Rash while taking a medicine or within 3 days of stopping it  • Negative: [1] Asthma and [2] having symptoms of asthma (cough, wheezing, etc.)  • Negative: [1] Symptom of illness (e.g., headache, abdominal pain, earache, vomiting) AND [2] more than mild  • Negative: Breastfeeding questions about mother's medicines and diet  • Negative: MORE THAN A DOUBLE DOSE of a prescription or over-the-counter (OTC) drug  • Negative: [1] DOUBLE DOSE (an extra dose or lesser amount) of prescription drug AND [2] any symptoms (e.g., dizziness, nausea, pain, sleepiness)  • Negative: [1] DOUBLE DOSE (an extra dose or lesser amount) of over-the-counter (OTC) drug AND [2] any symptoms (e.g., dizziness, nausea, pain, sleepiness)  • Negative: Took another person's prescription drug  • Negative: [1] DOUBLE DOSE (an  extra dose or lesser amount) of prescription drug AND [2] NO symptoms (Exception: a double dose of antibiotics)  • Negative: Diabetes drug error or overdose (e.g., took wrong type of insulin or took extra dose)  • Negative: [1] Prescription refill request for ESSENTIAL medicine (i.e., likelihood of harm to patient if not taken) AND [2] triager unable to refill per department policy  • Negative: [1] Prescription not at pharmacy AND [2] was prescribed by PCP recently (Exception: triager has access to EMR and prescription is recorded there. Go to Home Care and confirm for pharmacy.)  • Negative: [1] Pharmacy calling with prescription question AND [2] triager unable to answer question  • Negative: [1] Caller has URGENT medicine question about med that PCP or specialist prescribed AND [2] triager unable to answer question  • Negative: Medicine patch causing local rash or itching  • Negative: [1] Caller has medicine question about med NOT prescribed by PCP AND [2] triager unable to answer question (e.g., compatibility with other med, storage)  • Negative: Prescription request for new medicine (not a refill)  • Negative: Prescription refill request for a CONTROLLED substance (e.g., narcotics, ADHD medicines)  • Negative: [1] Prescription refill request for NON-ESSENTIAL medicine (i.e., no harm to patient if med not taken) AND [2] triager unable to refill per department policy  • Negative: [1] Caller has NON-URGENT medicine question about med that PCP prescribed AND [2] triager unable to answer question  • Negative: Caller wants to use a complementary or alternative medicine  • Negative: [1] Prescription prescribed recently is not at pharmacy AND [2] triager has access to patient's EMR AND [3] prescription is recorded in the EMR  • Negative: [1] DOUBLE DOSE (an extra dose or lesser amount) of over-the-counter (OTC) drug AND [2] NO symptoms  • Negative: [1] DOUBLE DOSE (an extra dose or lesser amount) of antibiotic drug AND  "[2] NO symptoms    Answer Assessment - Initial Assessment Questions  1. NAME of MEDICATION: \"What medicine are you calling about?\"      Hydrocodone and acetaminophen extra strength  2. QUESTION: \"What is your question?\" (e.g., medication refill, side effect)      Which medication would be best to take before bedtime for pain  3. PRESCRIBING HCP: \"Who prescribed it?\" Reason: if prescribed by specialist, call should be referred to that group.      Dr. Saucedo, post-op pain med  4. SYMPTOMS: \"Do you have any symptoms?\"      Having some pain  5. SEVERITY: If symptoms are present, ask \"Are they mild, moderate or severe?\"      Not having that much pain right now  6. PREGNANCY:  \"Is there any chance that you are pregnant?\" \"When was your last menstrual period?\"      na    Protocols used: MEDICATION QUESTION CALL-ADULT-      "

## 2022-01-25 ENCOUNTER — TELEPHONE (OUTPATIENT)
Dept: ORTHOPEDIC SURGERY | Facility: HOSPITAL | Age: 82
End: 2022-01-25

## 2022-01-25 NOTE — TELEPHONE ENCOUNTER
Mr Kimberly contacted me a this time regarding a question about weight bearing status as he is SP RTS.  He was concerned because he accidentally lifted an I pad with his Right arm. Explained that if he is strong enough to lift the I Pad it's not going to hurt anything. Don't push pull or lift anything more than a cup. An I Pad is about the same as a cup full of fluid. He said otherwise he is doing good. Pain is controlled he feels he is getting strength and ROM back. He is still using the ling and is able to use his arm. He is to F/U with MD Wednesday and hopes he can get out of the sling. Told him that was unlikely. But he may lift some restrictions on use. He doesn't have any questions for me at this time. He has my contact informatin should he need anything.

## 2022-01-26 ENCOUNTER — OFFICE VISIT (OUTPATIENT)
Dept: ORTHOPEDIC SURGERY | Facility: CLINIC | Age: 82
End: 2022-01-26

## 2022-01-26 VITALS — TEMPERATURE: 97.5 F | HEIGHT: 71 IN | BODY MASS INDEX: 23.8 KG/M2 | WEIGHT: 170 LBS

## 2022-01-26 DIAGNOSIS — Z09 SURGERY FOLLOW-UP: ICD-10-CM

## 2022-01-26 DIAGNOSIS — M19.011 PRIMARY LOCALIZED OSTEOARTHROSIS OF RIGHT SHOULDER REGION: Primary | ICD-10-CM

## 2022-01-26 PROCEDURE — 99024 POSTOP FOLLOW-UP VISIT: CPT | Performed by: ORTHOPAEDIC SURGERY

## 2022-01-26 PROCEDURE — 73030 X-RAY EXAM OF SHOULDER: CPT | Performed by: ORTHOPAEDIC SURGERY

## 2022-01-26 NOTE — PROGRESS NOTES
"Hu Harris : 1940 MRN: [id number] DATE: 2022    DIAGNOSIS:  2 week follow up right shoulder arthroplasty      SUBJECTIVE:  Patient returns today for 2 week follow up of right shoulder replacement. Patient reports doing well with no unusual complaints.      OBJECTIVE:    Temp 97.5 °F (36.4 °C) (Temporal)   Ht 180.3 cm (70.98\")   Wt 77.1 kg (170 lb)   BMI 23.72 kg/m²     Exam:  The incision is well approximated.  No erythema or drainage. Shoulder moves fluidly with pendulums.  The arm is soft and nontender.  Intact motor and sensory function in the lower arm and hand.  Palpable radial pulse.    DIAGNOSTIC STUDIES    Xrays: AP and scapular Y views of the right shoulder are ordered and reviewed for evaluation of the recent shoulder replacement.  The x-rays demonstrate a well positioned, well aligned replacement without complicating factors noted.  In comparison with previous films, there has been no change.    ASSESSMENT: 2 week follow up right shoulder replacement.    PLAN:   1.  Begin PT per protocol--prescription given along with 2 copies of my protocol.  2.  Continue sling until next visit.  3.  Counseled patient about appropriate activity modifications and restrictions, including no driving at this point.    Shahzad Saucedo MD    "

## 2022-02-23 ENCOUNTER — OFFICE VISIT (OUTPATIENT)
Dept: ORTHOPEDIC SURGERY | Facility: CLINIC | Age: 82
End: 2022-02-23

## 2022-02-23 VITALS — TEMPERATURE: 95.9 F | HEIGHT: 72 IN | WEIGHT: 170 LBS | BODY MASS INDEX: 23.03 KG/M2

## 2022-02-23 DIAGNOSIS — Z09 SURGERY FOLLOW-UP: Primary | ICD-10-CM

## 2022-02-23 DIAGNOSIS — M19.011 PRIMARY LOCALIZED OSTEOARTHROSIS OF RIGHT SHOULDER REGION: ICD-10-CM

## 2022-02-23 PROCEDURE — 99024 POSTOP FOLLOW-UP VISIT: CPT | Performed by: NURSE PRACTITIONER

## 2022-02-23 PROCEDURE — 73030 X-RAY EXAM OF SHOULDER: CPT | Performed by: NURSE PRACTITIONER

## 2022-02-23 RX ORDER — HYDROCODONE BITARTRATE AND ACETAMINOPHEN 5; 325 MG/1; MG/1
TABLET ORAL
Qty: 42 TABLET | Refills: 0 | Status: SHIPPED | OUTPATIENT
Start: 2022-02-23

## 2022-02-23 RX ORDER — CLINDAMYCIN HYDROCHLORIDE 300 MG/1
CAPSULE ORAL
Qty: 2 CAPSULE | Refills: 1 | Status: SHIPPED | OUTPATIENT
Start: 2022-02-23

## 2022-02-23 NOTE — PROGRESS NOTES
"Hu Harris : 1940 MRN: [id number] DATE: 2022    DIAGNOSIS: 6 week follow up right shoulder arthroplasty      SUBJECTIVE:  Patient returns today for 6 week follow up of right shoulder replacement. Patient reports doing well with no unusual complaints.     OBJECTIVE:    Temp 95.9 °F (35.5 °C)   Ht 182.9 cm (72\")   Wt 77.1 kg (170 lb)   BMI 23.06 kg/m²     Exam: The incision is well healed. No erythema or drainage. Shoulder moves fluidly with pendulums.  Motion is on track per protocol.  The arm is soft and nontender.  Good motor and sensory function.  Palpable distal pulses.     DIAGNOSTIC STUDIES    Xrays: AP and scapular Y views of the right shoulder are ordered and reviewed for evaluation of shoulder replacement.  They demonstrate a well positioned, well aligned replacement without complicating factors noted.  In comparison with previous films there has been no change.    ASSESSMENT: 6 week follow up right shoulder replacement    PLAN:   1.  Continue PT per protocol.  2.  Discontinue sling and begin working on progressing ROM as tolerated.  3.  We discussed the need for prophylactic antibiotics prior to dental appointments.  I have given him a prescription for clindamycin for his future dental appointment.  4.  I have agreed to provided him with a refill of Norco today, but have reduced the strength.    5.  Counseled patient about appropriate activity modifications and restrictions.  Released to drive at this point.  6.  Follow up with Dr. Saucedo in 6 weeks.     Lia Baeza, APRN    2022     "

## 2022-04-20 ENCOUNTER — OFFICE VISIT (OUTPATIENT)
Dept: ORTHOPEDIC SURGERY | Facility: CLINIC | Age: 82
End: 2022-04-20

## 2022-04-20 VITALS — WEIGHT: 170 LBS | HEIGHT: 72 IN | BODY MASS INDEX: 23.03 KG/M2 | TEMPERATURE: 98 F

## 2022-04-20 DIAGNOSIS — Z96.611 STATUS POST TOTAL SHOULDER REPLACEMENT, RIGHT: Primary | ICD-10-CM

## 2022-04-20 PROCEDURE — 73030 X-RAY EXAM OF SHOULDER: CPT | Performed by: NURSE PRACTITIONER

## 2022-04-20 PROCEDURE — 99213 OFFICE O/P EST LOW 20 MIN: CPT | Performed by: NURSE PRACTITIONER

## 2022-04-20 NOTE — PROGRESS NOTES
"Hu Harris : 1940 MRN: [id number] DATE: 2022    DIAGNOSIS: 3 month follow up right shoulder arthroplasty      SUBJECTIVE:  Patient returns today for 3 month follow up of right shoulder replacement.  Patient reports doing well with no unusual complaints. Still in PT and reports making good progress.    OBJECTIVE:    Temp 98 °F (36.7 °C)   Ht 182.9 cm (72.01\")   Wt 77.1 kg (170 lb)   BMI 23.05 kg/m²     Review of Systems negative except as above    Exam:  The incision is well healed. No effusion.  Range of motion:  °.  ER 50°.  IR to L5. The arm is soft and nontender.  Good strength with elevation and abduction.  Sensation intact distally.  Brisk cap refill.    DIAGNOSTIC STUDIES    Xrays: AP, scapular Y and axillary views of the right shoulder are ordered and reviewed for evaluation of shoulder replacement. They demonstrate a well positioned, well aligned replacement without complicating factors noted.  In comparison with previous films there has been no change.    ASSESSMENT: 3 month follow up right shoulder replacement.    PLAN:   1.  Continue appropriate activity modifications and restrictions as discussed  2.  Continue PT per protocol.  3.  I explained that one can expect continued improvement in motion, function, and any residual discomfort for up to 1 year after surgery.  4.  Follow up in 6 weeks with Dr. Saucedo.    Lia Baeza, APRN    2022     "

## 2022-06-13 ENCOUNTER — OFFICE VISIT (OUTPATIENT)
Dept: ORTHOPEDIC SURGERY | Facility: CLINIC | Age: 82
End: 2022-06-13

## 2022-06-13 VITALS — BODY MASS INDEX: 24.23 KG/M2 | TEMPERATURE: 98 F | HEIGHT: 71 IN | WEIGHT: 173.1 LBS

## 2022-06-13 DIAGNOSIS — Z96.611 STATUS POST TOTAL SHOULDER REPLACEMENT, RIGHT: Primary | ICD-10-CM

## 2022-06-13 PROCEDURE — 99212 OFFICE O/P EST SF 10 MIN: CPT | Performed by: ORTHOPAEDIC SURGERY

## 2022-06-13 PROCEDURE — 73030 X-RAY EXAM OF SHOULDER: CPT | Performed by: ORTHOPAEDIC SURGERY

## 2022-06-13 NOTE — PROGRESS NOTES
"Hu Harris : 1940 MRN: [id number] DATE: 2022    DIAGNOSIS: 5 month follow up right shoulder arthroplasty      SUBJECTIVE:  Mr. Harris returns today for 3 month follow up of right shoulder replacement.  Patient reports doing well with no unusual complaints. Still in PT and reports making good progress.  He still gets some intermittent discomfort with certain movements such as extending when hitting a golf shot.    OBJECTIVE:    Temp 98 °F (36.7 °C) (Temporal)   Ht 180.3 cm (71\")   Wt 78.5 kg (173 lb 1.6 oz)   BMI 24.14 kg/m²     Review of Systems negative except as above    Exam:  The incision is well healed. No effusion.  Range of motion:  .  ER 45.  IR T12. The arm is soft and nontender.  Good strength with elevation and abduction.  Sensation intact distally.  Brisk cap refill.    DIAGNOSTIC STUDIES    Xrays: AP, scapular Y and axillary views of the right shoulder are ordered and reviewed for evaluation of shoulder replacement. They demonstrate a well positioned, well aligned replacement without complicating factors noted.  In comparison with previous films there has been no change.    ASSESSMENT: 5 month follow up right shoulder replacement.    PLAN:   1.  Continue appropriate activity modifications and restrictions as discussed  2.  Continue PT per protocol.  3.  I explained that one can expect continued improvement in motion, function, and any residual discomfort for up to 1 year after surgery.  4.  Follow up at 1 year unless experiencing any new or concerning symptoms.    Shahzad Saucedo MD    2022     "

## 2022-12-12 ENCOUNTER — OFFICE VISIT (OUTPATIENT)
Dept: ORTHOPEDIC SURGERY | Facility: CLINIC | Age: 82
End: 2022-12-12

## 2022-12-12 VITALS — WEIGHT: 174.1 LBS | HEIGHT: 72 IN | TEMPERATURE: 97.6 F | BODY MASS INDEX: 23.58 KG/M2

## 2022-12-12 DIAGNOSIS — Z96.611 STATUS POST TOTAL SHOULDER REPLACEMENT, RIGHT: Primary | ICD-10-CM

## 2022-12-12 PROCEDURE — 99212 OFFICE O/P EST SF 10 MIN: CPT | Performed by: ORTHOPAEDIC SURGERY

## 2022-12-12 PROCEDURE — 73030 X-RAY EXAM OF SHOULDER: CPT | Performed by: ORTHOPAEDIC SURGERY

## 2022-12-12 NOTE — PROGRESS NOTES
"Hu Harris : 1940 MRN: [id number] DATE: 2022    DIAGNOSIS:  Annual follow up for right shoulder arthroplasty      SUBJECTIVE:  Patient returns today for annual follow up of a right shoulder replacement. Patient reports doing well with no unusual complaints. Denies any limitations due to the shoulder.    OBJECTIVE:    Temp 97.6 °F (36.4 °C)   Ht 182.9 cm (72\")   Wt 79 kg (174 lb 1.6 oz)   BMI 23.61 kg/m²   Family History   Problem Relation Age of Onset   • Heart disease Other    • Hypertension Other    • Malig Hyperthermia Neg Hx      Past Medical History:   Diagnosis Date   • Anxiety    • Arthritis    • Back pain    • Disease of thyroid gland     graves   • High cholesterol    • Right shoulder pain      Past Surgical History:   Procedure Laterality Date   • FOOT SURGERY Bilateral    • HERNIA REPAIR     • KNEE ARTHROSCOPY Right 2017    Procedure: KNEE ARTHROSCOPY WITH PARTICAL MEDIAL MENISECTOMY AND ARTHRITIS DEBRIDMENT;  Surgeon: Jimena Ho MD;  Location: [facility];  Service:    • KNEE SURGERY Left     partial replacement    • TOTAL SHOULDER ARTHROPLASTY Right 2022    Procedure: TOTAL SHOULDER ARTHROPLASTY;  Surgeon: Shahzad Saucedo MD;  Location: [facility];  Service: Orthopedics;  Laterality: Right;     Social History     Socioeconomic History   • Marital status:    Tobacco Use   • Smoking status: Former     Packs/day: 0.50     Years: 12.00     Pack years: 6.00     Types: Cigarettes     Quit date:      Years since quittin.9   • Smokeless tobacco: Never   Vaping Use   • Vaping Use: Never used   Substance and Sexual Activity   • Alcohol use: Yes     Comment: 3 DRINKS WEEK (beer and vodka)   • Drug use: No   • Sexual activity: Defer       14 point review of systems is reviewed with the patient.  Pertinent positives are listed above.  All others are negative.    Exam: The incision is well healed.  No effusion.  FE: 165.  ER 55.  IR T10 . The arm is " soft and nontender.  Good strength with elevation and abduction. Intact sensation to light touch.  Palpable radial pulse    DIAGNOSTIC STUDIES    Xrays: AP, scapular Y and axillary views of the right shoulder are ordered and reviewed for evaluation of shoulder replacement. The x-rays demonstrate a well positioned, well aligned replacement without complicating factors noted. In comparison with previous films there has been no change.    ASSESSMENT:  Annual follow up for right shoulder replacement.    PLAN:  Appropriate activity modifications and restrictions discussed.  Antibiotic prophylaxis recommendations discussed.  Continue annual follow-up.    Shahzad Saucedo MD    12/12/2022

## 2023-02-19 ENCOUNTER — PATIENT MESSAGE (OUTPATIENT)
Dept: ORTHOPEDIC SURGERY | Facility: CLINIC | Age: 83
End: 2023-02-19
Payer: MEDICARE

## 2023-02-19 DIAGNOSIS — Z79.2 NEED FOR PROPHYLACTIC ANTIBIOTIC: Primary | ICD-10-CM

## 2023-02-20 ENCOUNTER — TELEPHONE (OUTPATIENT)
Dept: ORTHOPEDIC SURGERY | Facility: CLINIC | Age: 83
End: 2023-02-20
Payer: MEDICARE

## 2023-02-20 RX ORDER — CEPHALEXIN 500 MG/1
CAPSULE ORAL
Qty: 4 CAPSULE | Refills: 1 | Status: SHIPPED | OUTPATIENT
Start: 2023-02-20

## 2023-02-20 RX ORDER — CLINDAMYCIN HYDROCHLORIDE 300 MG/1
CAPSULE ORAL
Qty: 2 CAPSULE | Refills: 1 | Status: CANCELLED | OUTPATIENT
Start: 2023-02-20

## 2023-02-20 NOTE — TELEPHONE ENCOUNTER
From: Hu Harris  To: Lia Baeza  Sent: 2/19/2023 11:03 AM EST  Subject: Dental Appointment    Lia..hope all is well. My shoulder is 110%. I have a dental james next week…do I still need and an antibiotic prior to getting teeth cleaned ? It has been just over a year since surgery. If so, please send script to [facility].  ThanksHu

## 2023-02-20 NOTE — TELEPHONE ENCOUNTER
----- Message from Hu Harris sent at 2/19/2023 11:03 AM EST -----  Regarding: Dental Appointment  Contact: [phone]  Lia..hope all is well. My shoulder is 110%.  I have a dental james next week…do I still need and an antibiotic prior to getting teeth cleaned ? It has been just over a year since surgery. If so, please send script to [facility].  Thanks, Hu

## 2023-12-13 ENCOUNTER — OFFICE VISIT (OUTPATIENT)
Dept: ORTHOPEDIC SURGERY | Facility: CLINIC | Age: 83
End: 2023-12-13
Payer: MEDICARE

## 2023-12-13 VITALS — WEIGHT: 173.7 LBS | TEMPERATURE: 98.6 F | BODY MASS INDEX: 23.53 KG/M2 | HEIGHT: 72 IN

## 2023-12-13 DIAGNOSIS — M19.019 ARTHRITIS OF SHOULDER: ICD-10-CM

## 2023-12-13 DIAGNOSIS — M25.512 LEFT SHOULDER PAIN, UNSPECIFIED CHRONICITY: Primary | ICD-10-CM

## 2023-12-13 RX ORDER — LIDOCAINE HYDROCHLORIDE 10 MG/ML
2 INJECTION, SOLUTION EPIDURAL; INFILTRATION; INTRACAUDAL; PERINEURAL
Status: COMPLETED | OUTPATIENT
Start: 2023-12-13 | End: 2023-12-13

## 2023-12-13 RX ORDER — FAMOTIDINE 10 MG
20 TABLET ORAL AS NEEDED
COMMUNITY

## 2023-12-13 RX ORDER — METHYLPREDNISOLONE ACETATE 80 MG/ML
80 INJECTION, SUSPENSION INTRA-ARTICULAR; INTRALESIONAL; INTRAMUSCULAR; SOFT TISSUE
Status: COMPLETED | OUTPATIENT
Start: 2023-12-13 | End: 2023-12-13

## 2023-12-13 RX ADMIN — LIDOCAINE HYDROCHLORIDE 2 ML: 10 INJECTION, SOLUTION EPIDURAL; INFILTRATION; INTRACAUDAL; PERINEURAL at 09:37

## 2023-12-13 RX ADMIN — METHYLPREDNISOLONE ACETATE 80 MG: 80 INJECTION, SUSPENSION INTRA-ARTICULAR; INTRALESIONAL; INTRAMUSCULAR; SOFT TISSUE at 09:37

## 2023-12-13 NOTE — PROGRESS NOTES
"Chief Complaint: New complaint left shoulder pain    HPI: Mr. Harris is seen today for his left shoulder.  He reports a gradual history of worsening pain and dysfunction.  Symptoms are reminiscent of those that he was having on the right side prior to his replacement.  Incidentally, he says the right shoulder is \"fantastic\".  Describes his left shoulder pain as mild to moderate, constant and aching.  Denies any mechanical symptoms.  Denies any weakness.    Exam: Left shoulder is examined.  Skin is benign.  No atrophy, swelling or masses.  Mild tenderness over the rotator interval.  No effusion or increased warmth.  His motion is overall very good.  He certainly has full functional motion.  He can abduct, elevate, internally and externally rotate against resistance with good strength and no significant pain.    Imaging: AP, scapular Y and axillary views left shoulder are ordered and reviewed evaluate his complaint.  No comparison films are immediately available.  He does have moderate glenohumeral osteoarthritis with joint space narrowing and subchondral sclerosis.    Assessment: Left shoulder osteoarthritis    Plan: We discussed his options.  He he wanted to try a glenohumeral injection.  The risk, benefits and alternatives were discussed.  He consented and the injection was performed as described below.  Going forward, he will follow-up as needed.    Shahzad Saucedo MD  12/13/2023      Large Joint Arthrocentesis: L glenohumeral  Date/Time: 12/13/2023 9:37 AM  Consent given by: patient  Site marked: site marked  Timeout: Immediately prior to procedure a time out was called to verify the correct patient, procedure, equipment, support staff and site/side marked as required   Supporting Documentation  Indications: pain   Procedure Details  Location: shoulder - L glenohumeral  Preparation: Patient was prepped and draped in the usual sterile fashion  Needle gauge: 21 G.  Approach: anterior  Medications administered: 2 " mL lidocaine PF 1% 1 %; 80 mg methylPREDNISolone acetate 80 MG/ML  Patient tolerance: patient tolerated the procedure well with no immediate complications

## 2024-04-09 NOTE — PROGRESS NOTES
New Shoulder      Patient: Hu Harris        YOB: 1940    Medical Record Number: [id number]        Chief Complaints: Left shoulder pain    History of Present Illness: This is a 83-year-old male who have seen the past for right shoulder arthritis I sent him to Dr. Saucedo he did a right shoulder replacement he also in December injected his left shoulder for arthritis is not ready to do a shoulder replacement he wanted to see me to do further injections      Allergies:   Allergies   Allergen Reactions    Iodine Swelling     WITH SHELLFISH    Penicillins Other (See Comments)     TOLD SO YEARS AGO    Shellfish-Derived Products Other (See Comments)     SWELLING       Medications:   Home Medications:  Current Outpatient Medications on File Prior to Visit   Medication Sig    acetaminophen (TYLENOL) 500 MG tablet Take 1 tablet by mouth 2 (Two) Times a Day As Needed for Mild Pain  (pain).    Aspirin 81 MG capsule Take 81 mg by mouth Every Morning. HOLD PRIOR TO SURG    cephalexin (KEFLEX) 500 MG capsule Take 4 capsules by mouth as 1 dose 1 hour prior to dental appointment. (Patient not taking: Reported on 12/13/2023)    clindamycin (Cleocin) 300 MG capsule Take 2 tablets by mouth as 1 dose 1 hour prior to dental appointment. (Patient not taking: Reported on 12/13/2023)    clonazePAM (KlonoPIN) 0.5 MG tablet Take 0.25 mg by mouth At Night As Needed. (Patient not taking: Reported on 12/13/2023)    docusate sodium (COLACE) 100 MG capsule Take 1 capsule by mouth 2 (Two) Times a Day. (Patient not taking: Reported on 12/13/2023)    famotidine (PEPCID) 10 MG tablet Take 2 tablets by mouth As Needed for Heartburn.    gabapentin (NEURONTIN) 300 MG capsule Take 1 capsule by mouth 2 (Two) Times a Day As Needed (pain). (Patient not taking: Reported on 12/13/2023)    HYDROcodone-acetaminophen (Norco) 5-325 MG per tablet Take 1-2 tabs po Q 4-6 hours prn pain.  Not to exceed 6 tabs per day. (Patient not taking: Reported  on 12/13/2023)    levothyroxine (SYNTHROID, LEVOTHROID) 88 MCG tablet Take 1 tablet by mouth Every Morning.    meloxicam (MOBIC) 15 MG tablet TAKE 1 TABLET BY MOUTH DAILY WITH FOOD (Patient not taking: Reported on 12/13/2023)    Misc Natural Products (Saw [place]) capsule Take 1 capsule by mouth.    multivitamin with minerals tablet tablet Take 1 tablet by mouth Daily. HOLD PRIOR TO SURG    Omega-3 Fatty Acids (fish oil) 1000 MG capsule capsule Take 1,000 mg by mouth Daily With Breakfast. HOLD PRIOR TO SURG (Patient not taking: Reported on 12/13/2023)    omeprazole (priLOSEC) 20 MG capsule Take 20 mg by mouth Daily As Needed. (Patient not taking: Reported on 12/13/2023)    ondansetron (Zofran) 4 MG tablet Take 1 tablet by mouth Every 8 (Eight) Hours As Needed for Nausea or Vomiting. (Patient not taking: Reported on 12/13/2023)    pravastatin (PRAVACHOL) 20 MG tablet Take 1 tablet by mouth Every Night.    tamsulosin (FLOMAX) 0.4 MG capsule 24 hr capsule Take 1 tablet by mouth daily beginning on 1/11/2022 (Patient not taking: Reported on 12/13/2023)    Zinc 50 MG tablet Take 1 tablet by mouth Daily. HOLD PRIOR TO SURG     Current Facility-Administered Medications on File Prior to Visit   Medication    benzoyl peroxide 5 % external liquid     Current Medications:  Scheduled Meds:  Continuous Infusions:No current facility-administered medications for this visit.    PRN Meds:.    Past Medical History:   Diagnosis Date    Anxiety     Arthritis     Back pain     Disease of thyroid gland     graves    High cholesterol     Periarthritis of shoulder     Right shoulder pain         Past Surgical History:   Procedure Laterality Date    FOOT SURGERY Bilateral     HERNIA REPAIR  1997    KNEE ARTHROSCOPY Right 9/11/2017    Procedure: KNEE ARTHROSCOPY WITH PARTICAL MEDIAL MENISECTOMY AND ARTHRITIS DEBRIDMENT;  Surgeon: Jimena Ho MD;  Location: [facility];  Service:     KNEE SURGERY Left 1972    partial replacement      TOTAL SHOULDER ARTHROPLASTY Right 2022    Procedure: TOTAL SHOULDER ARTHROPLASTY;  Surgeon: Shahzad Saucedo MD;  Location: [facility];  Service: Orthopedics;  Laterality: Right;        Social History     Occupational History    Not on file   Tobacco Use    Smoking status: Former     Current packs/day: 0.00     Average packs/day: 0.5 packs/day for 12.0 years (6.0 ttl pk-yrs)     Types: Cigarettes     Start date:      Quit date:      Years since quittin.3     Passive exposure: Past    Smokeless tobacco: Never   Vaping Use    Vaping status: Never Used   Substance and Sexual Activity    Alcohol use: Yes     Alcohol/week: 2.0 standard drinks of alcohol     Types: 1 Cans of beer, 1 Shots of liquor per week     Comment: 3 DRINKS WEEK (beer and vodka)    Drug use: No    Sexual activity: Yes     Partners: Female      Social History     Social History Narrative    Not on file        Family History   Problem Relation Age of Onset    Heart disease Other     Hypertension Other     Malig Hyperthermia Neg Hx              Review of Systems:     Review of Systems      Physical Exam: 83 y.o. male  General Appearance:    Alert, cooperative, in no acute distress                 There were no vitals filed for this visit.   Patient is alert and read ×3 no acute distress appears her above-listed at height weight and age.  Affect is normal respiratory rate is normal unlabored. Heart rate regular rate rhythm, sclera, dentition and hearing are normal for the purpose of this exam.    Ortho Exam  Physical exam the left shoulder reveals no overlying skin changes no lymphedema lymphadenopathy the patient can actively flex to about 150 passively I get them to 160 abduction is similar external rotation is 40 internal rotation to there buttock.  Rotator cuff strength is 4+ over 5 with isometric strength testing no overlying skin changes.  Patient has reasonable cervical range of motion for their age no radicular symptoms and  a normal elbow exam.  There are good distal pulses.   Large Joint Arthrocentesis: L glenohumeral  Date/Time: 4/11/2024 3:22 PM  Consent given by: patient  Site marked: site marked  Timeout: Immediately prior to procedure a time out was called to verify the correct patient, procedure, equipment, support staff and site/side marked as required   Supporting Documentation  Indications: pain   Procedure Details  Location: shoulder - L glenohumeral  Preparation: Patient was prepped and draped in the usual sterile fashion  Needle gauge: 21G.  Approach: posterior  Medications administered: 1 mL methylPREDNISolone acetate 80 MG/ML; 2 mL lidocaine PF 1% 1 %  Patient tolerance: patient tolerated the procedure well with no immediate complications            Radiology:   AP, Scapular Y and Axillary Lateral of the left shoulder were /reviewed to evauate shoulder pain.  I did review these from December he has glenohumeral arthritis with complete loss of joint space  Imaging Results (Most Recent)       None          Assessment/Plan: Left shoulder OA he is not ready for replacement by his own account he is getting ready to go to [place] with his son he would like to do an injection which is reasonable  Cortisone Injection. See procedure note.  Cortisone Injection for DIAGNOSTIC and THERAPUTIC purposes.

## 2024-04-11 ENCOUNTER — OFFICE VISIT (OUTPATIENT)
Dept: ORTHOPEDIC SURGERY | Facility: CLINIC | Age: 84
End: 2024-04-11
Payer: MEDICARE

## 2024-04-11 VITALS — TEMPERATURE: 98.4 F | HEIGHT: 72 IN | BODY MASS INDEX: 23.81 KG/M2 | WEIGHT: 175.8 LBS

## 2024-04-11 DIAGNOSIS — M19.019 GLENOHUMERAL ARTHRITIS: Primary | ICD-10-CM

## 2024-04-11 RX ORDER — METHYLPREDNISOLONE ACETATE 80 MG/ML
1 INJECTION, SUSPENSION INTRA-ARTICULAR; INTRALESIONAL; INTRAMUSCULAR; SOFT TISSUE
Status: COMPLETED | OUTPATIENT
Start: 2024-04-11 | End: 2024-04-11

## 2024-04-11 RX ORDER — LIDOCAINE HYDROCHLORIDE 10 MG/ML
2 INJECTION, SOLUTION EPIDURAL; INFILTRATION; INTRACAUDAL; PERINEURAL
Status: COMPLETED | OUTPATIENT
Start: 2024-04-11 | End: 2024-04-11

## 2024-04-11 RX ADMIN — LIDOCAINE HYDROCHLORIDE 2 ML: 10 INJECTION, SOLUTION EPIDURAL; INFILTRATION; INTRACAUDAL; PERINEURAL at 15:22

## 2024-04-11 RX ADMIN — METHYLPREDNISOLONE ACETATE 1 ML: 80 INJECTION, SUSPENSION INTRA-ARTICULAR; INTRALESIONAL; INTRAMUSCULAR; SOFT TISSUE at 15:22

## 2025-01-08 ENCOUNTER — OFFICE VISIT (OUTPATIENT)
Dept: ORTHOPEDIC SURGERY | Facility: CLINIC | Age: 85
End: 2025-01-08
Payer: MEDICARE

## 2025-01-08 VITALS — HEIGHT: 72 IN | BODY MASS INDEX: 23.47 KG/M2 | TEMPERATURE: 98.1 F | WEIGHT: 173.3 LBS

## 2025-01-08 DIAGNOSIS — G89.29 CHRONIC LEFT SHOULDER PAIN: Primary | ICD-10-CM

## 2025-01-08 DIAGNOSIS — M19.012 ARTHRITIS OF LEFT SHOULDER REGION: ICD-10-CM

## 2025-01-08 DIAGNOSIS — M25.512 CHRONIC LEFT SHOULDER PAIN: Primary | ICD-10-CM

## 2025-01-08 PROCEDURE — 20610 DRAIN/INJ JOINT/BURSA W/O US: CPT | Performed by: NURSE PRACTITIONER

## 2025-01-08 PROCEDURE — 73030 X-RAY EXAM OF SHOULDER: CPT | Performed by: NURSE PRACTITIONER

## 2025-01-08 PROCEDURE — 1160F RVW MEDS BY RX/DR IN RCRD: CPT | Performed by: NURSE PRACTITIONER

## 2025-01-08 PROCEDURE — 1159F MED LIST DOCD IN RCRD: CPT | Performed by: NURSE PRACTITIONER

## 2025-01-08 RX ORDER — LIDOCAINE HYDROCHLORIDE 10 MG/ML
2 INJECTION, SOLUTION EPIDURAL; INFILTRATION; INTRACAUDAL; PERINEURAL
Status: COMPLETED | OUTPATIENT
Start: 2025-01-08 | End: 2025-01-08

## 2025-01-08 RX ORDER — METHYLPREDNISOLONE ACETATE 80 MG/ML
80 INJECTION, SUSPENSION INTRA-ARTICULAR; INTRALESIONAL; INTRAMUSCULAR; SOFT TISSUE
Status: COMPLETED | OUTPATIENT
Start: 2025-01-08 | End: 2025-01-08

## 2025-01-08 RX ADMIN — METHYLPREDNISOLONE ACETATE 80 MG: 80 INJECTION, SUSPENSION INTRA-ARTICULAR; INTRALESIONAL; INTRAMUSCULAR; SOFT TISSUE at 11:20

## 2025-01-08 RX ADMIN — LIDOCAINE HYDROCHLORIDE 2 ML: 10 INJECTION, SOLUTION EPIDURAL; INFILTRATION; INTRACAUDAL; PERINEURAL at 11:20

## 2025-01-08 NOTE — PROGRESS NOTES
Mr. Harris comes in today for follow-up.  Injections have worked well in the past.  The patient would like to get a repeat injection today.      Imaging:  AP, scapular Y, and axillary views of the left shoulder are ordered by myself and reviewed to evaluate the patient's complaint.  These are compared to previous x-rays.  The x-rays show severe glenohumeral osteoarthritis with bone on bone degeneration, osteophyte formation, and subchondral sclerosis.  The acromiohumeral interval measures normal.  Mild progressive degenerative changes noted compared to previous x-rays from 2023.    The risks, benefits and alternatives were discussed and the patient consented.  Going forward, the patient will follow-up as needed.    Lia Baeza, FELIPE    01/08/2025      Large Joint Arthrocentesis: L glenohumeral  Date/Time: 1/8/2025 11:20 AM  Consent given by: patient  Site marked: site marked  Timeout: Immediately prior to procedure a time out was called to verify the correct patient, procedure, equipment, support staff and site/side marked as required   Supporting Documentation  Indications: pain   Procedure Details  Location: shoulder - L glenohumeral  Preparation: Patient was prepped and draped in the usual sterile fashion  Needle gauge: 21 G.  Approach: anterior  Medications administered: 2 mL lidocaine PF 1% 1 %; 80 mg methylPREDNISolone acetate 80 MG/ML  Patient tolerance: patient tolerated the procedure well with no immediate complications

## 2025-05-21 ENCOUNTER — TELEPHONE (OUTPATIENT)
Dept: ORTHOPEDIC SURGERY | Facility: CLINIC | Age: 85
End: 2025-05-21

## 2025-05-21 NOTE — TELEPHONE ENCOUNTER
Caller: Hu Harris    Relationship: Self    Best call back number:     What is the best time to reach you: ANY     Who are you requesting to speak with (clinical staff, provider,  specific staff member): CLINICAL      What was the call regarding: PATIENT HAS A QUESTION ABOUT SURGERY AND WOULD LIKE TO SPEAK WITH PRADEEP     Is it okay if the provider responds through MyChart: PLEASE CALL

## 2025-05-22 NOTE — TELEPHONE ENCOUNTER
I have attempted to call Rubina Kimberly. I did not get an answer. I have sent a [employer] message so he may ask the question via [employer]

## (undated) DEVICE — DRSNG ADAPTIC 3X8

## (undated) DEVICE — PK ARTHSCP 40

## (undated) DEVICE — PREP SOL POVIDONE/IODINE BT 4OZ

## (undated) DEVICE — HANDPIECE SET WITH COAXIAL HIGH FLOW TIP AND SUCTION TUBE: Brand: INTERPULSE

## (undated) DEVICE — GLV SURG BIOGEL LTX PF 8

## (undated) DEVICE — ABL APOLLO RF M/PRT 90D

## (undated) DEVICE — GLV SURG BIOGEL LTX PF 6 1/2

## (undated) DEVICE — TUBING, SUCTION, 1/4" X 20', STRAIGHT: Brand: MEDLINE INDUSTRIES, INC.

## (undated) DEVICE — WEBRIL* CAST PADDING: Brand: DEROYAL

## (undated) DEVICE — GLV SURG BIOGEL LTX PF 8 1/2

## (undated) DEVICE — DRAPE,U/ SHT,SPLIT,PLAS,STERIL: Brand: MEDLINE

## (undated) DEVICE — APPL CHLORAPREP HI/LITE 26ML ORNG

## (undated) DEVICE — DRSNG WND GZ CURAD OIL EMULSION 3X3IN STRL

## (undated) DEVICE — DRSNG SURESITE WNDW 4X4.5

## (undated) DEVICE — BNDG ELAS ELITE V/CLOSE 6IN 5YD LF STRL

## (undated) DEVICE — SKIN PREP TRAY W/CHG: Brand: MEDLINE INDUSTRIES, INC.

## (undated) DEVICE — SUT VIC 2/0 CT2 27IN J269H

## (undated) DEVICE — TRAP FLD MINIVAC MEGADYNE 100ML

## (undated) DEVICE — GLV SURG SENSICARE GREEN W/ALOE PF LF 6.5 STRL

## (undated) DEVICE — BLD DISSCT COOL CUT SJ CRVD 4MM 13CM

## (undated) DEVICE — GLV SURG SIGNATURE ESSENTIAL PF LTX SZ8.5

## (undated) DEVICE — SUT SILK 2/0 TIES 18IN A185H

## (undated) DEVICE — GOWN,SIRUS,NON REINFRCD,LARGE,SET IN SL: Brand: MEDLINE

## (undated) DEVICE — SPNG GZ WOVN 4X4IN 12PLY 10/BX STRL

## (undated) DEVICE — BNDG ELAS ELITE V/CLOSE 4IN 5YD LF STRL

## (undated) DEVICE — MAT FLR ABSORBENT LG 4FT 10 2.5FT

## (undated) DEVICE — SOL ISO/ALC RUB 70PCT 4OZ

## (undated) DEVICE — DUAL CUT SAGITTAL BLADE

## (undated) DEVICE — 3M™ IOBAN™ 2 ANTIMICROBIAL INCISE DRAPE 6640EZ: Brand: IOBAN™ 2

## (undated) DEVICE — SUT ETHLN 3/0 PS1 18IN 1663H

## (undated) DEVICE — DRSNG TELFA PAD NONADH STR 1S 3X8IN

## (undated) DEVICE — PENCL E/S ULTRAVAC TELESCP NOSE HOLSTR 10FT

## (undated) DEVICE — GLV SURG PREMIERPRO ORTHO LTX PF SZ8 BRN

## (undated) DEVICE — UNDERCAST PADDING: Brand: DEROYAL

## (undated) DEVICE — PK SHLDR OPN 40

## (undated) DEVICE — TBG ARTHSCP PT W CONN/REDUC 8FT

## (undated) DEVICE — DISPOSABLE TOURNIQUET CUFF SINGLE BLADDER, SINGLE PORT AND QUICK CONNECT CONNECTOR: Brand: COLOR CUFF